# Patient Record
Sex: FEMALE | Race: WHITE | HISPANIC OR LATINO | Employment: UNEMPLOYED | URBAN - METROPOLITAN AREA
[De-identification: names, ages, dates, MRNs, and addresses within clinical notes are randomized per-mention and may not be internally consistent; named-entity substitution may affect disease eponyms.]

---

## 2017-05-27 ENCOUNTER — HOSPITAL ENCOUNTER (EMERGENCY)
Facility: MEDICAL CENTER | Age: 22
End: 2017-05-27
Attending: EMERGENCY MEDICINE
Payer: COMMERCIAL

## 2017-05-27 ENCOUNTER — APPOINTMENT (OUTPATIENT)
Dept: RADIOLOGY | Facility: MEDICAL CENTER | Age: 22
End: 2017-05-27
Attending: EMERGENCY MEDICINE
Payer: COMMERCIAL

## 2017-05-27 VITALS
DIASTOLIC BLOOD PRESSURE: 54 MMHG | OXYGEN SATURATION: 98 % | HEIGHT: 60 IN | BODY MASS INDEX: 25.32 KG/M2 | WEIGHT: 128.97 LBS | HEART RATE: 112 BPM | RESPIRATION RATE: 16 BRPM | TEMPERATURE: 99.4 F | SYSTOLIC BLOOD PRESSURE: 96 MMHG

## 2017-05-27 DIAGNOSIS — N12 PYELONEPHRITIS: ICD-10-CM

## 2017-05-27 LAB
ALBUMIN SERPL BCP-MCNC: 3.3 G/DL (ref 3.2–4.9)
ALBUMIN/GLOB SERPL: 0.9 G/DL
ALP SERPL-CCNC: 101 U/L (ref 30–99)
ALT SERPL-CCNC: 17 U/L (ref 2–50)
ANION GAP SERPL CALC-SCNC: 10 MMOL/L (ref 0–11.9)
APPEARANCE UR: ABNORMAL
APPEARANCE UR: CLEAR
AST SERPL-CCNC: 23 U/L (ref 12–45)
BACTERIA #/AREA URNS HPF: ABNORMAL /HPF
BASOPHILS # BLD AUTO: 0.8 % (ref 0–1.8)
BASOPHILS # BLD: 0.11 K/UL (ref 0–0.12)
BILIRUB SERPL-MCNC: 0.4 MG/DL (ref 0.1–1.5)
BILIRUB UR QL STRIP.AUTO: NEGATIVE
BUN SERPL-MCNC: 11 MG/DL (ref 8–22)
CALCIUM SERPL-MCNC: 9.1 MG/DL (ref 8.5–10.5)
CHLORIDE SERPL-SCNC: 93 MMOL/L (ref 96–112)
CO2 SERPL-SCNC: 24 MMOL/L (ref 20–33)
COLOR UR AUTO: YELLOW
COLOR UR: YELLOW
CREAT SERPL-MCNC: 0.74 MG/DL (ref 0.5–1.4)
EOSINOPHIL # BLD AUTO: 0.09 K/UL (ref 0–0.51)
EOSINOPHIL NFR BLD: 0.7 % (ref 0–6.9)
EPI CELLS #/AREA URNS HPF: ABNORMAL /HPF
ERYTHROCYTE [DISTWIDTH] IN BLOOD BY AUTOMATED COUNT: 39 FL (ref 35.9–50)
GFR SERPL CREATININE-BSD FRML MDRD: >60 ML/MIN/1.73 M 2
GLOBULIN SER CALC-MCNC: 3.7 G/DL (ref 1.9–3.5)
GLUCOSE SERPL-MCNC: 113 MG/DL (ref 65–99)
GLUCOSE UR QL STRIP.AUTO: NEGATIVE MG/DL
GLUCOSE UR STRIP.AUTO-MCNC: NEGATIVE MG/DL
HCG SERPL QL: NEGATIVE
HCG UR QL: NEGATIVE
HCT VFR BLD AUTO: 36.1 % (ref 37–47)
HGB BLD-MCNC: 12.6 G/DL (ref 12–16)
HYALINE CASTS #/AREA URNS LPF: ABNORMAL /LPF
IMM GRANULOCYTES # BLD AUTO: 0.09 K/UL (ref 0–0.11)
IMM GRANULOCYTES NFR BLD AUTO: 0.7 % (ref 0–0.9)
KETONES UR QL STRIP.AUTO: NEGATIVE MG/DL
KETONES UR STRIP.AUTO-MCNC: NEGATIVE MG/DL
LACTATE BLD-SCNC: 1.3 MMOL/L (ref 0.5–2)
LEUKOCYTE ESTERASE UR QL STRIP.AUTO: ABNORMAL
LEUKOCYTE ESTERASE UR QL STRIP.AUTO: ABNORMAL
LYMPHOCYTES # BLD AUTO: 1.03 K/UL (ref 1–4.8)
LYMPHOCYTES NFR BLD: 7.7 % (ref 22–41)
MCH RBC QN AUTO: 30.1 PG (ref 27–33)
MCHC RBC AUTO-ENTMCNC: 34.9 G/DL (ref 33.6–35)
MCV RBC AUTO: 86.2 FL (ref 81.4–97.8)
MICRO URNS: ABNORMAL
MONOCYTES # BLD AUTO: 2.03 K/UL (ref 0–0.85)
MONOCYTES NFR BLD AUTO: 15.3 % (ref 0–13.4)
MUCOUS THREADS #/AREA URNS HPF: ABNORMAL /HPF
NEUTROPHILS # BLD AUTO: 9.95 K/UL (ref 2–7.15)
NEUTROPHILS NFR BLD: 74.8 % (ref 44–72)
NITRITE UR QL STRIP.AUTO: NEGATIVE
NITRITE UR QL STRIP.AUTO: NEGATIVE
NRBC # BLD AUTO: 0 K/UL
NRBC BLD AUTO-RTO: 0 /100 WBC
PH UR STRIP.AUTO: 6 [PH]
PH UR STRIP.AUTO: 6.5 [PH]
PLATELET # BLD AUTO: 279 K/UL (ref 164–446)
PMV BLD AUTO: 8.7 FL (ref 9–12.9)
POTASSIUM SERPL-SCNC: 3.5 MMOL/L (ref 3.6–5.5)
PROT SERPL-MCNC: 7 G/DL (ref 6–8.2)
PROT UR QL STRIP: 100 MG/DL
PROT UR QL STRIP: 70 MG/DL
RBC # BLD AUTO: 4.19 M/UL (ref 4.2–5.4)
RBC # URNS HPF: ABNORMAL /HPF
RBC UR QL AUTO: ABNORMAL
RBC UR QL AUTO: NEGATIVE
SODIUM SERPL-SCNC: 127 MMOL/L (ref 135–145)
SP GR UR STRIP.AUTO: 1.02
SP GR UR: 1.02
WBC # BLD AUTO: 13.3 K/UL (ref 4.8–10.8)
WBC #/AREA URNS HPF: ABNORMAL /HPF

## 2017-05-27 PROCEDURE — 700111 HCHG RX REV CODE 636 W/ 250 OVERRIDE (IP): Performed by: EMERGENCY MEDICINE

## 2017-05-27 PROCEDURE — 87086 URINE CULTURE/COLONY COUNT: CPT

## 2017-05-27 PROCEDURE — 85025 COMPLETE CBC W/AUTO DIFF WBC: CPT

## 2017-05-27 PROCEDURE — 96365 THER/PROPH/DIAG IV INF INIT: CPT

## 2017-05-27 PROCEDURE — 71010 DX-CHEST-PORTABLE (1 VIEW): CPT

## 2017-05-27 PROCEDURE — 99283 EMERGENCY DEPT VISIT LOW MDM: CPT

## 2017-05-27 PROCEDURE — 83605 ASSAY OF LACTIC ACID: CPT

## 2017-05-27 PROCEDURE — 80053 COMPREHEN METABOLIC PANEL: CPT

## 2017-05-27 PROCEDURE — 84703 CHORIONIC GONADOTROPIN ASSAY: CPT

## 2017-05-27 PROCEDURE — A9270 NON-COVERED ITEM OR SERVICE: HCPCS | Performed by: EMERGENCY MEDICINE

## 2017-05-27 PROCEDURE — 74176 CT ABD & PELVIS W/O CONTRAST: CPT

## 2017-05-27 PROCEDURE — 81002 URINALYSIS NONAUTO W/O SCOPE: CPT | Mod: 59

## 2017-05-27 PROCEDURE — 87040 BLOOD CULTURE FOR BACTERIA: CPT

## 2017-05-27 PROCEDURE — 87077 CULTURE AEROBIC IDENTIFY: CPT

## 2017-05-27 PROCEDURE — 81025 URINE PREGNANCY TEST: CPT

## 2017-05-27 PROCEDURE — 700105 HCHG RX REV CODE 258: Performed by: EMERGENCY MEDICINE

## 2017-05-27 PROCEDURE — 700102 HCHG RX REV CODE 250 W/ 637 OVERRIDE(OP): Performed by: EMERGENCY MEDICINE

## 2017-05-27 PROCEDURE — 81001 URINALYSIS AUTO W/SCOPE: CPT

## 2017-05-27 PROCEDURE — 36415 COLL VENOUS BLD VENIPUNCTURE: CPT

## 2017-05-27 PROCEDURE — 87186 SC STD MICRODIL/AGAR DIL: CPT

## 2017-05-27 RX ORDER — SODIUM CHLORIDE 9 MG/ML
30 INJECTION, SOLUTION INTRAVENOUS ONCE
Status: COMPLETED | OUTPATIENT
Start: 2017-05-27 | End: 2017-05-27

## 2017-05-27 RX ORDER — CIPROFLOXACIN 500 MG/1
500 TABLET, FILM COATED ORAL 2 TIMES DAILY
Qty: 20 TAB | Refills: 0 | Status: SHIPPED | OUTPATIENT
Start: 2017-05-27 | End: 2017-06-06

## 2017-05-27 RX ORDER — ACETAMINOPHEN 325 MG/1
650 TABLET ORAL ONCE
Status: COMPLETED | OUTPATIENT
Start: 2017-05-27 | End: 2017-05-27

## 2017-05-27 RX ORDER — CEFTRIAXONE 1 G/1
1 INJECTION, POWDER, FOR SOLUTION INTRAMUSCULAR; INTRAVENOUS ONCE
Status: COMPLETED | OUTPATIENT
Start: 2017-05-27 | End: 2017-05-27

## 2017-05-27 RX ORDER — TRAMADOL HYDROCHLORIDE 50 MG/1
50-100 TABLET ORAL EVERY 6 HOURS PRN
Qty: 15 TAB | Refills: 0 | Status: SHIPPED | OUTPATIENT
Start: 2017-05-27 | End: 2017-07-07

## 2017-05-27 RX ORDER — ONDANSETRON 4 MG/1
4 TABLET, ORALLY DISINTEGRATING ORAL EVERY 8 HOURS PRN
Qty: 10 TAB | Refills: 0 | Status: SHIPPED | OUTPATIENT
Start: 2017-05-27 | End: 2017-07-07

## 2017-05-27 RX ADMIN — ACETAMINOPHEN 650 MG: 325 TABLET, FILM COATED ORAL at 17:22

## 2017-05-27 RX ADMIN — SODIUM CHLORIDE 1755 ML: 9 INJECTION, SOLUTION INTRAVENOUS at 17:15

## 2017-05-27 RX ADMIN — CEFTRIAXONE SODIUM 1 G: 1 INJECTION, POWDER, FOR SOLUTION INTRAMUSCULAR; INTRAVENOUS at 18:41

## 2017-05-27 NOTE — ED AVS SNAPSHOT
5/27/2017    Rosana Rick  [address] [facility], [place] [address]    Dear Rosana:    [facility] wants to ensure your discharge home is safe and you or your loved ones have had all of your questions answered regarding your care after you leave the hospital.    Below is a list of resources and contact information should you have any questions regarding your hospital stay, follow-up instructions, or active medical symptoms.    Questions or Concerns Regarding… Contact   Medical Questions Related to Your Discharge  (7 days a week, 8am-5pm) Contact a Nurse Care Coordinator   [phone]   Medical Questions Not Related to Your Discharge  (24 hours a day / 7 days a week)  Contact the Nurse Health Line   [phone]    Medications or Discharge Instructions Refer to your discharge packet   or contact your [facility] Primary Care Provider   [phone]   Follow-up Appointment(s) Schedule your appointment via City Notes   or contact Scheduling [phone]   Billing Review your statement via City Notes  or contact Billing [phone]   Medical Records Review your records via City Notes   or contact Medical Records [phone]     You may receive a telephone call within two days of discharge. This call is to make certain you understand your discharge instructions and have the opportunity to have any questions answered. You can also easily access your medical information, test results and upcoming appointments via the City Notes free online health management tool. You can learn more and sign up at InterResolve/City Notes. For assistance setting up your City Notes account, please call [phone].    Once again, we want to ensure your discharge home is safe and that you have a clear understanding of any next steps in your care. If you have any questions or concerns, please do not hesitate to contact us, we are here for you. Thank you for choosing [facility] for your healthcare needs.    Sincerely,    Your [facility] Healthcare Team

## 2017-05-27 NOTE — ED AVS SNAPSHOT
The Resumator Access Code: [email or web address]  Expires: 6/26/2017  7:03 PM    The Resumator  A secure, online tool to manage your health information     [employer]’s The Resumator® is a secure, online tool that connects you to your personalized health information from the privacy of your home -- day or night - making it very easy for you to manage your healthcare. Once the activation process is completed, you can even access your medical information using the The Resumator sheng, which is available for free in the Apple Sheng store or Google Play store.     The Resumator provides the following levels of access (as shown below):   My Chart Features   [facility] Primary Care Doctor [facility]  Specialists [facility]  Urgent  Care Non-[facility]  Primary Care  Doctor   Email your healthcare team securely and privately 24/7 X X X X   Manage appointments: schedule your next appointment; view details of past/upcoming appointments X      Request prescription refills. X      View recent personal medical records, including lab and immunizations X X X X   View health record, including health history, allergies, medications X X X X   Read reports about your outpatient visits, procedures, consult and ER notes X X X X   See your discharge summary, which is a recap of your hospital and/or ER visit that includes your diagnosis, lab results, and care plan. X X       How to register for The Resumator:  1. Go to  [email or web address].  2. Click on the Sign Up Now box, which takes you to the New Member Sign Up page. You will need to provide the following information:  a. Enter your The Resumator Access Code exactly as it appears at the top of this page. (You will not need to use this code after you’ve completed the sign-up process. If you do not sign up before the expiration date, you must request a new code.)   b. Enter your date of birth.   c. Enter your home email address.   d. Click Submit, and follow the next screen’s instructions.  3. Create a The Resumator ID. This will be your The Resumator  login ID and cannot be changed, so think of one that is secure and easy to remember.  4. Create a [employer] password. You can change your password at any time.  5. Enter your Password Reset Question and Answer. This can be used at a later time if you forget your password.   6. Enter your e-mail address. This allows you to receive e-mail notifications when new information is available in Learn[employer].  7. Click Sign Up. You can now view your health information.    For assistance activating your [employer] account, call [phone]

## 2017-05-27 NOTE — ED AVS SNAPSHOT
Home Care Instructions                                                                                                                Rosana Rick   MRN: [id number]    Department:  [facility], Emergency Dept   Date of Visit:  5/27/2017            [facility], Emergency Dept    [address] [facility] [address]    Phone:  [phone]      You were seen by     Butch Deleon M.D.      Your Diagnosis Was     Pyelonephritis     N12       These are the medications you received during your hospitalization from 05/27/2017 1641 to 05/27/2017 1903     Date/Time Order Dose Route Action    05/27/2017 1722 acetaminophen (TYLENOL) tablet 650 mg 650 mg Oral Given    05/27/2017 1715 NS infusion 1,755 mL 1,755 mL Intravenous New Bag    05/27/2017 1841 cefTRIAXone (ROCEPHIN) injection 1 g 1 g Intravenous Given      Follow-up Information     1. Follow up with [facility], Emergency Dept In 1 day.    Specialty:  Emergency Medicine    Why:  As needed, If symptoms worsen    Contact information    [address] [place] [id number] [phone]      Medication Information     Review all of your home medications and newly ordered medications with your primary doctor and/or pharmacist as soon as possible. Follow medication instructions as directed by your doctor and/or pharmacist.     Please keep your complete medication list with you and share with your physician. Update the information when medications are discontinued, doses are changed, or new medications (including over-the-counter products) are added; and carry medication information at all times in the event of emergency situations.               Medication List      START taking these medications        Instructions    Morning Afternoon Evening Bedtime    ciprofloxacin 500 MG Tabs   Commonly known as:  CIPRO        Take 1 Tab by mouth 2 times a day for 10 days.   Dose:  500 mg                        ondansetron  4 MG Tbdp   Commonly known as:  ZOFRAN ODT        Take 1 Tab by mouth every 8 hours as needed for Nausea/Vomiting.   Dose:  4 mg                        tramadol 50 MG Tabs   Commonly known as:  ULTRAM        Take 1-2 Tabs by mouth every 6 hours as needed for Moderate Pain.   Dose:   mg                             Where to Get Your Medications      You can get these medications from any pharmacy     Bring a paper prescription for each of these medications    - ciprofloxacin 500 MG Tabs  - ondansetron 4 MG Tbdp  - tramadol 50 MG Tabs            Procedures and tests performed during your visit     BLOOD CULTURE    CBC WITH DIFFERENTIAL    COMP METABOLIC PANEL    CT-RENAL COLIC EVALUATION(A/P W/O)    DX-CHEST-PORTABLE (1 VIEW)    ESTIMATED GFR    HCG QUAL SERUM    Lactic acid (lactate)    OXYGEN THERAPY PER PROTOCOL    POC UA    POC URINE PREGNANCY    URINALYSIS    URINE CULTURE(NEW)    URINE MICROSCOPIC (W/UA)        Discharge Instructions       Pyelonephritis, Adult  Pyelonephritis is a kidney infection. In general, there are 2 main types of pyelonephritis:  · Infections that come on quickly without any warning (acute pyelonephritis).  · Infections that persist for a long period of time (chronic pyelonephritis).  CAUSES   Two main causes of pyelonephritis are:  · Bacteria traveling from the bladder to the kidney. This is a problem especially in pregnant women. The urine in the bladder can become filled with bacteria from multiple causes, including:  ¨ Inflammation of the prostate gland (prostatitis).  ¨ Sexual intercourse in females.  ¨ Bladder infection (cystitis).  · Bacteria traveling from the bloodstream to the tissue part of the kidney.  Problems that may increase your risk of getting a kidney infection include:  · Diabetes.  · Kidney stones or bladder stones.  · Cancer.  · Catheters placed in the bladder.  · Other abnormalities of the kidney or ureter.  SYMPTOMS   · Abdominal pain.  · Pain in the side or  flank area.  · Fever.  · Chills.  · Upset stomach.  · Blood in the urine (dark urine).  · Frequent urination.  · Strong or persistent urge to urinate.  · Burning or stinging when urinating.  DIAGNOSIS   Your caregiver may diagnose your kidney infection based on your symptoms. A urine sample may also be taken.  TREATMENT   In general, treatment depends on how severe the infection is.   · If the infection is mild and caught early, your caregiver may treat you with oral antibiotics and send you home.  · If the infection is more severe, the bacteria may have gotten into the bloodstream. This will require intravenous (IV) antibiotics and a hospital stay. Symptoms may include:  ¨ High fever.  ¨ Severe flank pain.  ¨ Shaking chills.  · Even after a hospital stay, your caregiver may require you to be on oral antibiotics for a period of time.  · Other treatments may be required depending upon the cause of the infection.  HOME CARE INSTRUCTIONS   · Take your antibiotics as directed. Finish them even if you start to feel better.  · Make an appointment to have your urine checked to make sure the infection is gone.  · Drink enough fluids to keep your urine clear or pale yellow.  · Take medicines for the bladder if you have urgency and frequency of urination as directed by your caregiver.  SEEK IMMEDIATE MEDICAL CARE IF:   · You have a fever or persistent symptoms for more than 2-3 days.  · You have a fever and your symptoms suddenly get worse.  · You are unable to take your antibiotics or fluids.  · You develop shaking chills.  · You experience extreme weakness or fainting.  · There is no improvement after 2 days of treatment.  MAKE SURE YOU:  · Understand these instructions.  · Will watch your condition.  · Will get help right away if you are not doing well or get worse.     This information is not intended to replace advice given to you by your health care provider. Make sure you discuss any questions you have with your health  care provider.     Document Released: 12/18/2006 Document Revised: 06/18/2013 Document Reviewed: 05/23/2012  Elsevier Interactive Patient Education ©2016 Elsevier Inc.            Patient Information     Patient Information    Following emergency treatment: all patient requiring follow-up care must return either to a private physician or a clinic if your condition worsens before you are able to obtain further medical attention, please return to the emergency room.     Billing Information    At [facility], we work to make the billing process streamlined for our patients.  Our Representatives are here to answer any questions you may have regarding your hospital bill.  If you have insurance coverage and have supplied your insurance information to us, we will submit a claim to your insurer on your behalf.  Should you have any questions regarding your bill, we can be reached online or by phone as follows:  Online: You are able pay your bills online or live chat with our representatives about any billing questions you may have. We are here to help Monday - Friday from 8:00am to 7:30pm and 9:00am - 12:00pm on Saturdays.  Please visit [email or web address]  for more information.   Phone:  [phone] or [phone]    Please note that your emergency physician, surgeon, pathologist, radiologist, anesthesiologist, and other specialists are not employed by [facility] and will therefore bill separately for their services.  Please contact them directly for any questions concerning their bills at the numbers below:     Emergency Physician Services:  [phone]  [facility]:  [phone]  [facility]:  [phone]  [facility]:  [phone]    1. Your final bill may vary from the amount quoted upon discharge if all procedures are not complete at that time, or if your doctor has additional procedures of which we are not aware. You will receive  an additional bill if you return to the Emergency Department at [facility] for suture removal regardless of the facility of which the sutures were placed.     2. Please arrange for settlement of this account at the emergency registration.    3. All self-pay accounts are due in full at the time of treatment.  If you are unable to meet this obligation then payment is expected within 4-5 days.     4. If you have had radiology studies (CT, X-ray, Ultrasound, MRI), you have received a preliminary result during your emergency department visit. Please contact the radiology department [phone] to receive a copy of your final result. Please discuss the Final result with your primary physician or with the follow up physician provided.     Crisis Hotline:  [place] Crisis Hotline:  7-245-USCMOTN or 1-537.838.8863  [place] Crisis Hotline:    1-317.184.8059 or 114-922-1492         ED Discharge Follow Up Questions    1. In order to provide you with very good care, we would like to follow up with a phone call in the next few days.  May we have your permission to contact you?     YES /  NO    2. What is the best phone number to call you? (       )_____-__________    3. What is the best time to call you?      Morning  /  Afternoon  /  Evening                   Patient Signature:  ____________________________________________________________    Date:  ____________________________________________________________

## 2017-05-27 NOTE — ED NOTES
"Chief Complaint   Patient presents with   • Fever     \"on and off for 3 days\". Did not take with thermometer.   • Chills     Body aches and chills. Tired and weak.   • Flank Pain     c/o left sided flank pain radiating to her LUQ.      Pt with sepsis score of 3. Protocol initiated. Charge notified.   BP 96/54 mmHg  Pulse 112  Temp(Src) 37.4 °C (99.4 °F)  Resp 14  Ht 1.524 m (5')  Wt 58.5 kg (128 lb 15.5 oz)  BMI 25.19 kg/m2  SpO2 98%    "

## 2017-05-27 NOTE — LETTER
5/30/2017               Rosana Rick  [address] [address]        Dear Rosana (MR#[id number])    This letter is sent in regards to your, recent visit to the [facility] Emergency Department on 5/27/2017.  During the visit, tests were performed to assist the physician in a medical diagnosis.  A review of those tests requires that we notify you of the following:    Your urine culture was POSITIVE for a bacteria called Escherichia coli. The antibiotic prescribed for you (ciprofloxacin) should be active to treat this bacteria. IT IS IMPORTANT THAT YOU CONTINUE TAKING YOUR ANTIBIOTIC UNTIL IT IS FINISHED.      Please feel free to contact me at the number below if you have any questions or concerns. Thank you for your cooperation in the matter.    Sincerely,  ED Culture Follow-Up Staff  Esvin Jon, PharmD    [facility], Emergency Department  [address] [place] [id number]  [phone] (ED Culture Line)  [phone]

## 2017-05-28 NOTE — ED NOTES
Pt discharged home. Pt given discharge instructions and prescriptions. Pt verbalized understanding. All questions answered. Vital signs WNL upon discharge. Pt steady on feet upon discharge.

## 2017-05-28 NOTE — ED PROVIDER NOTES
"ED Provider Note    CHIEF COMPLAINT  Chief Complaint   Patient presents with   • Fever     \"on and off for 3 days\". Did not take with thermometer.   • Chills     Body aches and chills. Tired and weak.   • Flank Pain     c/o left sided flank pain radiating to her LUQ.        HPI  Rosana Rick is a 21 y.o. female who presents for evaluation of fever, nausea left-sided flank pain radiating to her left lower abdomen. Patient denies pregnancy. No significant medical or surgical history. She denies history of kidney stones. She reports dull achy pain accentuated by waves of 10 out of 10 pain with associated nausea and no diarrhea. No associated numbness tingling or weakness. No associated cough or sore throat. She does report some mild dysuria    REVIEW OF SYSTEMS  See HPI for further details. Positive for fevers nausea flank pain and dysuria All other systems are negative.     PAST MEDICAL HISTORY  History reviewed. No pertinent past medical history.  No significant medical history  FAMILY HISTORY  No history of bleeding disorder    SOCIAL HISTORY  Social History     Social History   • Marital Status: Single     Spouse Name: N/A   • Number of Children: N/A   • Years of Education: N/A     Social History Main Topics   • Smoking status: Never Smoker    • Smokeless tobacco: None   • Alcohol Use: Yes      Comment: weekends only   • Drug Use: No   • Sexual Activity: Not Asked     Other Topics Concern   • None     Social History Narrative     Denies IV drugs no excessive alcohol  SURGICAL HISTORY  History reviewed. No pertinent past surgical history.  No major surgeries  CURRENT MEDICATIONS  Home Medications     Reviewed by Cristine Tinoco R.N. (Registered Nurse) on 05/27/17 at 1650  Med List Status: Complete    Medication Last Dose Status          Patient Pal Taking any Medications                        ALLERGIES  Allergies   Allergen Reactions   • Nkda [No Known Drug Allergy]        PHYSICAL EXAM  VITAL SIGNS: BP 96/54 " mmHg  Pulse 112  Temp(Src) 37.4 °C (99.4 °F)  Resp 18  Ht 1.524 m (5')  Wt 58.5 kg (128 lb 15.5 oz)  BMI 25.19 kg/m2  SpO2 98% Room air O2: 98    Constitutional: Well developed, Well nourished, No acute distress, Non-toxic appearance.   HENT: Normocephalic, Atraumatic, Bilateral external ears normal mucous membranes, No oral exudates, Nose normal.   Eyes: PERRLA, EOMI, Conjunctiva normal, No discharge.   Neck: Normal range of motion, No tenderness, Supple, No stridor.   Lymphatic: No lymphadenopathy noted.   Cardiovascular: Mild tachycardia, Normal rhythm, No murmurs, No rubs, No gallops.   Thorax & Lungs: Normal breath sounds, No respiratory distress, No wheezing, No chest tenderness.   Abdomen: Bowel sounds normal, Soft, left flank pain, left lower quadrant discomfort.   Skin: Warm, Dry, No erythema, No rash.   Extremities: Intact distal pulses, No edema, No tenderness, No cyanosis, No clubbing.   Neurologic: Alert & oriented x 3, Normal motor function, Normal sensory function, No focal deficits noted.   Psychiatric: Affect normal, Judgment normal, Mood normal.     RADIOLOGY/PROCEDURES  CT-RENAL COLIC EVALUATION(A/P W/O)   Final Result      1.  There are several small left intrarenal calcifications but there is no obstructive urolithiasis. There is no hydronephrosis.      DX-CHEST-PORTABLE (1 VIEW)   Final Result      1.  There is no acute cardiopulmonary process.            COURSE & MEDICAL DECISION MAKING  Pertinent Labs & Imaging studies reviewed. (See chart for details)  Results for orders placed or performed during the hospital encounter of 05/27/17   Lactic acid (lactate)   Result Value Ref Range    Lactic Acid 1.3 0.5 - 2.0 mmol/L   CBC WITH DIFFERENTIAL   Result Value Ref Range    WBC 13.3 (H) 4.8 - 10.8 K/uL    RBC 4.19 (L) 4.20 - 5.40 M/uL    Hemoglobin 12.6 12.0 - 16.0 g/dL    Hematocrit 36.1 (L) 37.0 - 47.0 %    MCV 86.2 81.4 - 97.8 fL    MCH 30.1 27.0 - 33.0 pg    MCHC 34.9 33.6 - 35.0 g/dL     RDW 39.0 35.9 - 50.0 fL    Platelet Count 279 164 - 446 K/uL    MPV 8.7 (L) 9.0 - 12.9 fL    Neutrophils-Polys 74.80 (H) 44.00 - 72.00 %    Lymphocytes 7.70 (L) 22.00 - 41.00 %    Monocytes 15.30 (H) 0.00 - 13.40 %    Eosinophils 0.70 0.00 - 6.90 %    Basophils 0.80 0.00 - 1.80 %    Immature Granulocytes 0.70 0.00 - 0.90 %    Nucleated RBC 0.00 /100 WBC    Neutrophils (Absolute) 9.95 (H) 2.00 - 7.15 K/uL    Lymphs (Absolute) 1.03 1.00 - 4.80 K/uL    Monos (Absolute) 2.03 (H) 0.00 - 0.85 K/uL    Eos (Absolute) 0.09 0.00 - 0.51 K/uL    Baso (Absolute) 0.11 0.00 - 0.12 K/uL    Immature Granulocytes (abs) 0.09 0.00 - 0.11 K/uL    NRBC (Absolute) 0.00 K/uL   COMP METABOLIC PANEL   Result Value Ref Range    Sodium 127 (L) 135 - 145 mmol/L    Potassium 3.5 (L) 3.6 - 5.5 mmol/L    Chloride 93 (L) 96 - 112 mmol/L    Co2 24 20 - 33 mmol/L    Anion Gap 10.0 0.0 - 11.9    Glucose 113 (H) 65 - 99 mg/dL    Bun 11 8 - 22 mg/dL    Creatinine 0.74 0.50 - 1.40 mg/dL    Calcium 9.1 8.5 - 10.5 mg/dL    AST(SGOT) 23 12 - 45 U/L    ALT(SGPT) 17 2 - 50 U/L    Alkaline Phosphatase 101 (H) 30 - 99 U/L    Total Bilirubin 0.4 0.1 - 1.5 mg/dL    Albumin 3.3 3.2 - 4.9 g/dL    Total Protein 7.0 6.0 - 8.2 g/dL    Globulin 3.7 (H) 1.9 - 3.5 g/dL    A-G Ratio 0.9 g/dL   URINALYSIS   Result Value Ref Range    Micro Urine Req Microscopic     Color Yellow     Character Sl Cloudy (A)     Specific Gravity 1.018 <1.035    Ph 6.0 5.0-8.0    Glucose Negative Negative mg/dL    Ketones Negative Negative mg/dL    Protein 70 (A) Negative mg/dL    Bilirubin Negative Negative    Nitrite Negative Negative    Leukocyte Esterase Large (A) Negative    Occult Blood Negative Negative   HCG QUAL SERUM   Result Value Ref Range    Beta-Hcg Qualitative Serum Negative Negative   ESTIMATED GFR   Result Value Ref Range    GFR If African American >60 >60 mL/min/1.73 m 2    GFR If Non African American >60 >60 mL/min/1.73 m 2   URINE MICROSCOPIC (W/UA)   Result Value Ref Range     WBC  (A) /hpf    RBC 2-5 (A) /hpf    Bacteria Moderate (A) None /hpf    Epithelial Cells Few /hpf    Mucous Threads Few /hpf    Hyaline Cast 0-2 /lpf   POC UA   Result Value Ref Range    POC Color Yellow     POC Appearance Clear     POC Glucose Negative Negative mg/dL    POC Ketones Negative Negative mg/dL    POC Specific Gravity 1.020 1.005-1.030    POC Blood Trace-lysed (A) Negative    POC Urine PH 6.5 5.0-8.0    POC Protein 100 (A) Negative mg/dL    POC Nitrites Negative Negative    POC Leukocyte Esterase Small (A) Negative   POC URINE PREGNANCY   Result Value Ref Range    POC Urine Pregnancy Test Negative Negative      The patient here has evidence of low-grade fever leukocytosis and very clear evidence of left-sided pyelonephritis. CT scan demonstrates some intrarenal stones but no obstructing stones or evidence of infected kidney stone. Lactic acid is normal. She was given IV fluids and Tylenol and IV antibiotics. She does not appear clinically toxic. I'll discharge her home on oral antibiotics  FINAL IMPRESSION  1. Uncomplicated pyelonephritis         Electronically signed by: Butch Deleon, 5/27/2017 5:22 PM

## 2017-05-28 NOTE — DISCHARGE INSTRUCTIONS
Pyelonephritis, Adult  Pyelonephritis is a kidney infection. In general, there are 2 main types of pyelonephritis:  · Infections that come on quickly without any warning (acute pyelonephritis).  · Infections that persist for a long period of time (chronic pyelonephritis).  CAUSES   Two main causes of pyelonephritis are:  · Bacteria traveling from the bladder to the kidney. This is a problem especially in pregnant women. The urine in the bladder can become filled with bacteria from multiple causes, including:  ¨ Inflammation of the prostate gland (prostatitis).  ¨ Sexual intercourse in females.  ¨ Bladder infection (cystitis).  · Bacteria traveling from the bloodstream to the tissue part of the kidney.  Problems that may increase your risk of getting a kidney infection include:  · Diabetes.  · Kidney stones or bladder stones.  · Cancer.  · Catheters placed in the bladder.  · Other abnormalities of the kidney or ureter.  SYMPTOMS   · Abdominal pain.  · Pain in the side or flank area.  · Fever.  · Chills.  · Upset stomach.  · Blood in the urine (dark urine).  · Frequent urination.  · Strong or persistent urge to urinate.  · Burning or stinging when urinating.  DIAGNOSIS   Your caregiver may diagnose your kidney infection based on your symptoms. A urine sample may also be taken.  TREATMENT   In general, treatment depends on how severe the infection is.   · If the infection is mild and caught early, your caregiver may treat you with oral antibiotics and send you home.  · If the infection is more severe, the bacteria may have gotten into the bloodstream. This will require intravenous (IV) antibiotics and a hospital stay. Symptoms may include:  ¨ High fever.  ¨ Severe flank pain.  ¨ Shaking chills.  · Even after a hospital stay, your caregiver may require you to be on oral antibiotics for a period of time.  · Other treatments may be required depending upon the cause of the infection.  HOME CARE INSTRUCTIONS   · Take your  antibiotics as directed. Finish them even if you start to feel better.  · Make an appointment to have your urine checked to make sure the infection is gone.  · Drink enough fluids to keep your urine clear or pale yellow.  · Take medicines for the bladder if you have urgency and frequency of urination as directed by your caregiver.  SEEK IMMEDIATE MEDICAL CARE IF:   · You have a fever or persistent symptoms for more than 2-3 days.  · You have a fever and your symptoms suddenly get worse.  · You are unable to take your antibiotics or fluids.  · You develop shaking chills.  · You experience extreme weakness or fainting.  · There is no improvement after 2 days of treatment.  MAKE SURE YOU:  · Understand these instructions.  · Will watch your condition.  · Will get help right away if you are not doing well or get worse.     This information is not intended to replace advice given to you by your health care provider. Make sure you discuss any questions you have with your health care provider.     Document Released: 12/18/2006 Document Revised: 06/18/2013 Document Reviewed: 05/23/2012  [employer] Interactive Patient Education ©2016 [employer]

## 2017-05-29 LAB
BACTERIA UR CULT: ABNORMAL
BACTERIA UR CULT: ABNORMAL
SIGNIFICANT IND 70042: ABNORMAL
SITE SITE: ABNORMAL
SOURCE SOURCE: ABNORMAL

## 2017-05-30 NOTE — ED NOTES
ED Positive Culture Follow-up/Notification Note:    Date: 5/30/17     Patient seen in the ED on 5/27/2017 for fever, nausea, left-sided flank pain. Also reports mild dysuria. Leukocytosis on CBC. Afebrile. CT positive for intrarenal stones.    1. Pyelonephritis       Discharge Medication List as of 5/27/2017  7:03 PM      START taking these medications    Details   ciprofloxacin (CIPRO) 500 MG Tab Take 1 Tab by mouth 2 times a day for 10 days., Disp-20 Tab, R-0, Print Rx Paper      ondansetron (ZOFRAN ODT) 4 MG TABLET DISPERSIBLE Take 1 Tab by mouth every 8 hours as needed for Nausea/Vomiting., Disp-10 Tab, R-0, Print Rx Paper      tramadol (ULTRAM) 50 MG Tab Take 1-2 Tabs by mouth every 6 hours as needed for Moderate Pain., Disp-15 Tab, R-0, Print Rx Paper             Allergies: Nkda     Final cultures:   Results     Procedure Component Value Units Date/Time    URINE CULTURE(NEW) [[id number]]  (Abnormal)  (Susceptibility) Collected:  05/27/17 1700    Order Status:  Completed Specimen Information:  Urine Updated:  05/29/17 9740     Significant Indicator POS (POS)      Source UR      Site --      Urine Culture -- (A)      Urine Culture -- (A)      Result:        Escherichia coli  >100,000 cfu/mL      Narrative:      Indication for culture:->Emergency Room Patient    Culture & Susceptibility     ESCHERICHIA COLI     Antibiotic Sensitivity Microscan Unit Status    Ampicillin Resistant >16 mcg/mL Final    Cefepime Sensitive <=8 mcg/mL Final    Cefotaxime Sensitive <=2 mcg/mL Final    Cefotetan Sensitive <=16 mcg/mL Final    Ceftazidime Sensitive <=1 mcg/mL Final    Ceftriaxone Sensitive <=8 mcg/mL Final    Cefuroxime Sensitive <=4 mcg/mL Final    Cephalothin Intermediate 16 mcg/mL Final    Ciprofloxacin Sensitive <=1 mcg/mL Final    Gentamicin Sensitive <=4 mcg/mL Final    Levofloxacin Sensitive <=2 mcg/mL Final    Nitrofurantoin Sensitive <=32 mcg/mL Final    Pip/Tazobactam Sensitive <=16 mcg/mL Final    Piperacillin  "Resistant >64 mcg/mL Final    Tigecycline Sensitive <=2 mcg/mL Final    Tobramycin Sensitive <=4 mcg/mL Final    Trimeth/Sulfa Resistant >2/38 mcg/mL Final                       BLOOD CULTURE [[id number]] Collected:  05/27/17 1711    Order Status:  Completed Specimen Information:  Blood from Peripheral Updated:  05/28/17 0726     Significant Indicator NEG      Source BLD      Site PERIPHERAL      Blood Culture --      Result:        No Growth    Note: Blood cultures are incubated for 5 days and  are monitored continuously.Positive blood cultures  are called to the RN and reported as soon as  they are identified.      Narrative:      Per Hospital Policy: Only change Specimen Src: to \"Line\" if  specified by physician order.    BLOOD CULTURE [[id number]] Collected:  05/27/17 1818    Order Status:  Completed Specimen Information:  Blood from Peripheral Updated:  05/28/17 0726     Significant Indicator NEG      Source BLD      Site PERIPHERAL      Blood Culture --      Result:        No Growth    Note: Blood cultures are incubated for 5 days and  are monitored continuously.Positive blood cultures  are called to the RN and reported as soon as  they are identified.      Narrative:      Per Hospital Policy: Only change Specimen Src: to \"Line\" if  specified by physician order.    URINALYSIS [[id number]]  (Abnormal) Collected:  05/27/17 1700    Order Status:  Completed Specimen Information:  Urine Updated:  05/27/17 1822     Micro Urine Req Microscopic      Color Yellow      Character Sl Cloudy (A)      Specific Gravity 1.018      Ph 6.0      Glucose Negative mg/dL      Ketones Negative mg/dL      Protein 70 (A) mg/dL      Bilirubin Negative      Nitrite Negative      Leukocyte Esterase Large (A)      Occult Blood Negative     Narrative:      Indication for culture:->Emergency Room Patient          Plan:   Urine culture grew E coli. Blood cultures demonstrate NGTD.  Patient received 1 dose of ceftriaxone 1g prior to discharge on " po antibiotics.  Appropriate antibiotic therapy prescribed. No changes required based upon culture result.  Sent letter to patient to notify of positive culture result and encourage compliance with prescribed antibiotics.     Esvin Jon, PharmD

## 2017-06-01 LAB
BACTERIA BLD CULT: NORMAL
BACTERIA BLD CULT: NORMAL
SIGNIFICANT IND 70042: NORMAL
SIGNIFICANT IND 70042: NORMAL
SITE SITE: NORMAL
SITE SITE: NORMAL
SOURCE SOURCE: NORMAL
SOURCE SOURCE: NORMAL

## 2017-07-07 ENCOUNTER — HOSPITAL ENCOUNTER (EMERGENCY)
Facility: MEDICAL CENTER | Age: 22
End: 2017-07-07
Attending: EMERGENCY MEDICINE
Payer: COMMERCIAL

## 2017-07-07 VITALS
TEMPERATURE: 97.9 F | WEIGHT: 125.44 LBS | SYSTOLIC BLOOD PRESSURE: 97 MMHG | HEART RATE: 99 BPM | BODY MASS INDEX: 24.63 KG/M2 | RESPIRATION RATE: 16 BRPM | OXYGEN SATURATION: 94 % | HEIGHT: 60 IN | DIASTOLIC BLOOD PRESSURE: 64 MMHG

## 2017-07-07 DIAGNOSIS — A64 STI (SEXUALLY TRANSMITTED INFECTION): ICD-10-CM

## 2017-07-07 LAB
APPEARANCE UR: CLEAR
BACTERIA GENITAL QL WET PREP: NORMAL
C TRACH DNA SPEC QL NAA+PROBE: POSITIVE
COLOR UR AUTO: YELLOW
GLUCOSE UR QL STRIP.AUTO: NEGATIVE MG/DL
HCG UR QL: NEGATIVE
KETONES UR QL STRIP.AUTO: ABNORMAL MG/DL
LEUKOCYTE ESTERASE UR QL STRIP.AUTO: ABNORMAL
N GONORRHOEA DNA SPEC QL NAA+PROBE: POSITIVE
NITRITE UR QL STRIP.AUTO: NEGATIVE
PH UR STRIP.AUTO: 6.5 [PH]
PROT UR QL STRIP: ABNORMAL MG/DL
RBC UR QL AUTO: ABNORMAL
SIGNIFICANT IND 70042: NORMAL
SITE SITE: NORMAL
SOURCE SOURCE: NORMAL
SP GR UR: 1.02
SPECIMEN SOURCE: ABNORMAL

## 2017-07-07 PROCEDURE — 87591 N.GONORRHOEAE DNA AMP PROB: CPT

## 2017-07-07 PROCEDURE — 700102 HCHG RX REV CODE 250 W/ 637 OVERRIDE(OP): Performed by: EMERGENCY MEDICINE

## 2017-07-07 PROCEDURE — A9270 NON-COVERED ITEM OR SERVICE: HCPCS | Performed by: EMERGENCY MEDICINE

## 2017-07-07 PROCEDURE — 700111 HCHG RX REV CODE 636 W/ 250 OVERRIDE (IP): Performed by: EMERGENCY MEDICINE

## 2017-07-07 PROCEDURE — 96372 THER/PROPH/DIAG INJ SC/IM: CPT

## 2017-07-07 PROCEDURE — 87491 CHLMYD TRACH DNA AMP PROBE: CPT

## 2017-07-07 PROCEDURE — 81002 URINALYSIS NONAUTO W/O SCOPE: CPT

## 2017-07-07 PROCEDURE — 700101 HCHG RX REV CODE 250: Performed by: EMERGENCY MEDICINE

## 2017-07-07 PROCEDURE — 99284 EMERGENCY DEPT VISIT MOD MDM: CPT

## 2017-07-07 PROCEDURE — 81025 URINE PREGNANCY TEST: CPT

## 2017-07-07 RX ORDER — LIDOCAINE HYDROCHLORIDE 10 MG/ML
1 INJECTION, SOLUTION INFILTRATION; PERINEURAL ONCE
Status: COMPLETED | OUTPATIENT
Start: 2017-07-07 | End: 2017-07-07

## 2017-07-07 RX ORDER — CEFTRIAXONE SODIUM 250 MG/1
250 INJECTION, POWDER, FOR SOLUTION INTRAMUSCULAR; INTRAVENOUS ONCE
Status: COMPLETED | OUTPATIENT
Start: 2017-07-07 | End: 2017-07-07

## 2017-07-07 RX ORDER — AZITHROMYCIN 250 MG/1
1000 TABLET, FILM COATED ORAL ONCE
Status: COMPLETED | OUTPATIENT
Start: 2017-07-07 | End: 2017-07-07

## 2017-07-07 RX ORDER — CEFTRIAXONE 1 G/1
1000 INJECTION, POWDER, FOR SOLUTION INTRAMUSCULAR; INTRAVENOUS ONCE
Status: DISCONTINUED | OUTPATIENT
Start: 2017-07-07 | End: 2017-07-07

## 2017-07-07 RX ADMIN — CEFTRIAXONE SODIUM 250 MG: 250 INJECTION, POWDER, FOR SOLUTION INTRAMUSCULAR; INTRAVENOUS at 08:10

## 2017-07-07 RX ADMIN — AZITHROMYCIN 1000 MG: 250 TABLET, FILM COATED ORAL at 08:10

## 2017-07-07 RX ADMIN — LIDOCAINE HYDROCHLORIDE 1 ML: 10 INJECTION, SOLUTION INFILTRATION; PERINEURAL at 08:10

## 2017-07-07 ASSESSMENT — PAIN SCALES - GENERAL
PAINLEVEL_OUTOF10: 0
PAINLEVEL_OUTOF10: 0

## 2017-07-07 NOTE — ED AVS SNAPSHOT
[employer] Access Code: [email or web address]  Expires: 8/6/2017  8:16 AM    [employer]  A secure, online tool to manage your health information     [employer]® is a secure, online tool that connects you to your personalized health information from the privacy of your home -- day or night - making it very easy for you to manage your healthcare. Once the activation process is completed, you can even access your medical information using the [employer] sheng, which is available for free in the [employer] Sheng store or Google Play store.     [employer] provides the following levels of access (as shown below):   My Chart Features   [facility] Primary Care Doctor [facility]  Specialists [facility]  Urgent  Care Non-[facility]  Primary Care  Doctor   Email your healthcare team securely and privately 24/7 X X X X   Manage appointments: schedule your next appointment; view details of past/upcoming appointments X      Request prescription refills. X      View recent personal medical records, including lab and immunizations X X X X   View health record, including health history, allergies, medications X X X X   Read reports about your outpatient visits, procedures, consult and ER notes X X X X   See your discharge summary, which is a recap of your hospital and/or ER visit that includes your diagnosis, lab results, and care plan. X X       How to register for [employer]:  1. Go to  [email or web address].  2. Click on the Sign Up Now box, which takes you to the New Member Sign Up page. You will need to provide the following information:  a. Enter your [employer] Access Code exactly as it appears at the top of this page. (You will not need to use this code after you’ve completed the sign-up process. If you do not sign up before the expiration date, you must request a new code.)   b. Enter your date of birth.   c. Enter your home email address.   d. Click Submit, and follow the next screen’s instructions.  3. Create a [employer] ID. This will be your [employer]  login ID and cannot be changed, so think of one that is secure and easy to remember.  4. Create a [employer] password. You can change your password at any time.  5. Enter your Password Reset Question and Answer. This can be used at a later time if you forget your password.   6. Enter your e-mail address. This allows you to receive e-mail notifications when new information is available in [employer].  7. Click Sign Up. You can now view your health information.    For assistance activating your [employer] account, call [phone]

## 2017-07-07 NOTE — DISCHARGE INSTRUCTIONS
Sexually Transmitted Disease  A sexually transmitted disease (STD) is a disease or infection often passed to another person during sex. However, STDs can be passed through nonsexual ways. An STD can be passed through:  · Spit (saliva).  · Semen.  · Blood.  · Mucus from the vagina.  · Pee (urine).  HOW CAN I LESSEN MY CHANCES OF GETTING AN STD?  · Use:  ¨ Latex condoms.  ¨ Water-soluble lubricants with condoms. Do not use petroleum jelly or oils.  ¨ Dental dams. These are small pieces of latex that are used as a barrier during oral sex.  · Avoid having more than one sex partner.  · Do not have sex with someone who has other sex partners.  · Do not have sex with anyone you do not know or who is at high risk for an STD.  · Avoid risky sex that can break your skin.  · Do not have sex if you have open sores on your mouth or skin.  · Avoid drinking too much alcohol or taking illegal drugs. Alcohol and drugs can affect your good judgment.  · Avoid oral and anal sex acts.  · Get shots (vaccines) for HPV and hepatitis.  · If you are at risk of being infected with HIV, it is advised that you take a certain medicine daily to prevent HIV infection. This is called pre-exposure prophylaxis (PrEP). You may be at risk if:  ¨ You are a man who has sex with other men (MSM).  ¨ You are attracted to the opposite sex (heterosexual) and are having sex with more than one partner.  ¨ You take drugs with a needle.  ¨ You have sex with someone who has HIV.  · Talk with your doctor about if you are at high risk of being infected with HIV. If you begin to take PrEP, get tested for HIV first. Get tested every 3 months for as long as you are taking PrEP.  · Get tested for STDs every year if you are sexually active. If you are treated for an STD, get tested again 3 months after you are treated.  WHAT SHOULD I DO IF I THINK I HAVE AN STD?  · See your doctor.  · Tell your sex partner(s) that you have an STD. They should be tested and treated.  · Do  not have sex until your doctor says it is okay.  WHEN SHOULD I GET HELP?  Get help right away if:  · You have bad belly (abdominal) pain.  · You are a man and have puffiness (swelling) or pain in your testicles.  · You are a woman and have puffiness in your vagina.     This information is not intended to replace advice given to you by your health care provider. Make sure you discuss any questions you have with your health care provider.     Document Released: 01/25/2006 Document Revised: 01/08/2016 Document Reviewed: 06/13/2014  [employer] Interactive Patient Education ©2016 [employer]

## 2017-07-07 NOTE — ED NOTES
Pt discharge home. Pt given discharge instructions Pt verbalized understanding, all questions answered ,vss upon d/c. Pt steady on feet upon discharge

## 2017-07-07 NOTE — ED NOTES
Pt ambulatory to triage c/o foul smelling vaginal discharge x 1 week. Denies dysuria. C/o low abd pain with intercourse only. Instructed on clean catch urine collection. VSS. Educated on triage process, encouraged to inform staff of any changes.

## 2017-07-07 NOTE — ED PROVIDER NOTES
ED Provider Note    Scribed for Derrick Diamond M.D. by Gabbi Ramirez. 7/7/2017, 7:23 AM.    Primary care provider: Pcp Pt States None  Means of arrival: walk-in  History obtained from: Patient  History limited by: None    CHIEF COMPLAINT  Chief Complaint   Patient presents with   • Vaginal Discharge     x 1 week       HPI    Rosana Rick is a 21 y.o. female who presents to the Emergency Department with complaints of abnormal vaginal discharge with associated lower abdominal pain onset one week ago. Patient confirms associated pain with intercourse. Patient denies fever. Patient arrived to the ED with her sexual partner who also has similar complaints.       REVIEW OF SYSTEMS  Pertinent positives include abnormal vaginal discharge, lower abdominal pain, dyspareunia   Pertinent negatives include no fever  See HPI for details. E.       PAST MEDICAL HISTORY   none noted       SURGICAL HISTORY  patient denies any surgical history      SOCIAL HISTORY  Social History   Substance Use Topics   • Smoking status: Never Smoker    • Smokeless tobacco:    • Alcohol Use: Yes      Comment: weekends only      History   Drug Use No       FAMILY HISTORY  None noted   CURRENT MEDICATIONS  Home Medications     Reviewed by Alma Mcnally R.N. (Registered Nurse) on 07/07/17 at 0749  Med List Status: Complete    Medication Last Dose Status          Patient Pal Taking any Medications                        ALLERGIES  Allergies   Allergen Reactions   • Nkda [No Known Drug Allergy]        PHYSICAL EXAM  VITAL SIGNS: BP 99/68 mmHg  Pulse 81  Temp(Src) 36.6 °C (97.9 °F)  Resp 16  Ht 1.524 m (5')  Wt 56.9 kg (125 lb 7.1 oz)  BMI 24.50 kg/m2  SpO2 94%    Nursing note and vitals reviewed.  Constitutional: No distress.   HENT: Head is atraumatic. Oropharynx is moist.   Eyes: Conjunctivae are normal. Pupils are equal, round, and reactive to light.   Cardiovascular: Normal peripheral perfusion  Respiratory: No respiratory distress.    : Thick yellow discharge in vaginal vault. Adnexal normal.   Musculoskeletal: Normal range of motion.   Neurological: Alert. No focal deficits noted.    Skin: No rash.   Psych: Appropriate for clinical situation           DIAGNOSTIC STUDIES / PROCEDURES  LABS  Results for orders placed or performed during the hospital encounter of 07/07/17   WET PREP   Result Value Ref Range    Significant Indicator NEG     Source GEN     Site VAGINAL     Wet Prep For Parasites       No yeast.  No motile Trichomonas seen.  No clue cells seen.     CHLAMYDIA & GC BY PCR   Result Value Ref Range    Source Vaginal    POC UA   Result Value Ref Range    POC Color Yellow     POC Appearance Clear     POC Glucose Negative Negative mg/dL    POC Ketones Trace (A) Negative mg/dL    POC Specific Gravity 1.020 1.005-1.030    POC Blood Trace-intact (A) Negative    POC Urine PH 6.5 5.0-8.0    POC Protein Trace (A) Negative mg/dL    POC Nitrites Negative Negative    POC Leukocyte Esterase Moderate (A) Negative   POC URINE PREGNANCY   Result Value Ref Range    POC Urine Pregnancy Test Negative Negative     All labs reviewed by me.      COURSE & MEDICAL DECISION MAKING  Nursing notes, VS, PMSFHx reviewed in chart.    7:23 AM - Patient seen and examined at bedside. Patient will be treated with zithromax 1,000 mg and rocephin 250 mg. Ordered POC urine pregnancy, POC UA, POC urinalysis, POC urine pregnancy and chlamydia & GC by PCR to evaluate her symptoms.     7:30 AM Performed pelvic exam with female nurse present. See physical exam for details.     8:26 AM Patient reevaluated at bedside. The patient is resting comfortably. Discussed lab results with the patient as seen above. She agrees to discharge home. Patient encouraged to follow up with primary care for a complete STD rule out.       DISPOSITION:  Patient will be discharged home in stable condition.      FOLLOW UP:  [facility], Emergency Dept  [address]  [address]  [phone]    If symptoms worsen    [address] [address]  [phone]    for comprehensive sti testing        OUTPATIENT MEDICATIONS:  There are no discharge medications for this patient.      The patient was discharged home with an information sheet on sexually transmitted infections and told to return immediately for any signs or symptoms listed.  The patient agreed to the discharge precautions and follow-up plan which is documented in EPIC.    FINAL IMPRESSION  1. STI (sexually transmitted infection)       Gabbi LOVELL (Sabrina), am scribing for, and in the presence of, Derrick Diamond M.D..  Electronically signed by: Gabbi Zelaya), 7/7/2017  Derrick LOVELL M.D. personally performed the services described in this documentation, as scribed by Gabbi Ramirez in my presence, and it is both accurate and complete.    The note accurately reflects work and decisions made by me.  Derrick Diamond  7/7/2017  11:21 AM

## 2017-07-07 NOTE — ED AVS SNAPSHOT
Home Care Instructions                                                                                                                Rosana Rick   MRN: [id number]    Department:  [facility], Emergency Dept   Date of Visit:  7/7/2017            [facility], Emergency Dept    [address] [place] [id number]    Phone:  [phone]      You were seen by     Derrick Diamond M.D.      Your Diagnosis Was     STI (sexually transmitted infection)     A64       These are the medications you received during your hospitalization from 07/07/2017 0654 to 07/07/2017 0816     Date/Time Order Dose Route Action    07/07/2017 0810 azithromycin (ZITHROMAX) tablet 1,000 mg 1,000 mg Oral Given    07/07/2017 0810 cefTRIAXone (ROCEPHIN) injection 250 mg 250 mg Intramuscular Given    07/07/2017 0810 lidocaine (XYLOCAINE) 1 % injection 1 mL Other Given      Follow-up Information     1. Follow up with [facility], Emergency Dept.    Specialty:  Emergency Medicine    Why:  If symptoms worsen    Contact information    [address] [place] [id number] [phone]        2. Follow up with [facility].    Why:  for comprehensive sti testing    Contact information    [address] [place] [id number] [phone]      Medication Information     Review all of your home medications and newly ordered medications with your primary doctor and/or pharmacist as soon as possible. Follow medication instructions as directed by your doctor and/or pharmacist.     Please keep your complete medication list with you and share with your physician. Update the information when medications are discontinued, doses are changed, or new medications (including over-the-counter products) are added; and carry medication information at all times in the event of emergency situations.               Medication List      Notice     You have not been prescribed any medications.            Procedures and tests performed during your visit     CHLAMYDIA & GC BY PCR    POC UA    POC URINE PREGNANCY    WET PREP        Discharge Instructions       Sexually Transmitted Disease  A sexually transmitted disease (STD) is a disease or infection often passed to another person during sex. However, STDs can be passed through nonsexual ways. An STD can be passed through:  · Spit (saliva).  · Semen.  · Blood.  · Mucus from the vagina.  · Pee (urine).  HOW CAN I LESSEN MY CHANCES OF GETTING AN STD?  · Use:  ¨ Latex condoms.  ¨ Water-soluble lubricants with condoms. Do not use petroleum jelly or oils.  ¨ Dental dams. These are small pieces of latex that are used as a barrier during oral sex.  · Avoid having more than one sex partner.  · Do not have sex with someone who has other sex partners.  · Do not have sex with anyone you do not know or who is at high risk for an STD.  · Avoid risky sex that can break your skin.  · Do not have sex if you have open sores on your mouth or skin.  · Avoid drinking too much alcohol or taking illegal drugs. Alcohol and drugs can affect your good judgment.  · Avoid oral and anal sex acts.  · Get shots (vaccines) for HPV and hepatitis.  · If you are at risk of being infected with HIV, it is advised that you take a certain medicine daily to prevent HIV infection. This is called pre-exposure prophylaxis (PrEP). You may be at risk if:  ¨ You are a man who has sex with other men (MSM).  ¨ You are attracted to the opposite sex (heterosexual) and are having sex with more than one partner.  ¨ You take drugs with a needle.  ¨ You have sex with someone who has HIV.  · Talk with your doctor about if you are at high risk of being infected with HIV. If you begin to take PrEP, get tested for HIV first. Get tested every 3 months for as long as you are taking PrEP.  · Get tested for STDs every year if you are sexually active. If you are treated for an STD, get tested again 3 months after you are  treated.  WHAT SHOULD I DO IF I THINK I HAVE AN STD?  · See your doctor.  · Tell your sex partner(s) that you have an STD. They should be tested and treated.  · Do not have sex until your doctor says it is okay.  WHEN SHOULD I GET HELP?  Get help right away if:  · You have bad belly (abdominal) pain.  · You are a man and have puffiness (swelling) or pain in your testicles.  · You are a woman and have puffiness in your vagina.     This information is not intended to replace advice given to you by your health care provider. Make sure you discuss any questions you have with your health care provider.     Document Released: 01/25/2006 Document Revised: 01/08/2016 Document Reviewed: 06/13/2014  [employer] Interactive Patient Education ©2016 [employer]            Patient Information     Patient Information    Following emergency treatment: all patient requiring follow-up care must return either to a private physician or a clinic if your condition worsens before you are able to obtain further medical attention, please return to the emergency room.     Billing Information    At [facility], we work to make the billing process streamlined for our patients.  Our Representatives are here to answer any questions you may have regarding your hospital bill.  If you have insurance coverage and have supplied your insurance information to us, we will submit a claim to your insurer on your behalf.  Should you have any questions regarding your bill, we can be reached online or by phone as follows:  Online: You are able pay your bills online or live chat with our representatives about any billing questions you may have. We are here to help Monday - Friday from 8:00am to 7:30pm and 9:00am - 12:00pm on Saturdays.  Please visit [email or web address]  for more information.   Phone:  [phone] or [phone]    Please note that your emergency physician, surgeon, pathologist, radiologist,  anesthesiologist, and other specialists are not employed by [facility] and will therefore bill separately for their services.  Please contact them directly for any questions concerning their bills at the numbers below:     Emergency Physician Services:  [phone]  [facility]:  [phone]  [facility]:  [phone]  [facility]:  [phone]    1. Your final bill may vary from the amount quoted upon discharge if all procedures are not complete at that time, or if your doctor has additional procedures of which we are not aware. You will receive an additional bill if you return to the Emergency Department at [facility] for suture removal regardless of the facility of which the sutures were placed.     2. Please arrange for settlement of this account at the emergency registration.    3. All self-pay accounts are due in full at the time of treatment.  If you are unable to meet this obligation then payment is expected within 4-5 days.     4. If you have had radiology studies (CT, X-ray, Ultrasound, MRI), you have received a preliminary result during your emergency department visit. Please contact the radiology department [phone] to receive a copy of your final result. Please discuss the Final result with your primary physician or with the follow up physician provided.     Crisis Hotline:  [place] Crisis Hotline:  3-830-GWMPQVD or 1-552.312.3298  [place] Crisis Hotline:    1-525.711.7291 or 157-013-2367         ED Discharge Follow Up Questions    1. In order to provide you with very good care, we would like to follow up with a phone call in the next few days.  May we have your permission to contact you?     YES /  NO    2. What is the best phone number to call you? (       )_____-__________    3. What is the best time to call you?      Morning  /  Afternoon  /  Evening                   Patient Signature:   ____________________________________________________________    Date:  ____________________________________________________________

## 2017-07-10 NOTE — ED NOTES
ED Positive Culture Follow-up/Notification Note:    Date: 7/10/17     Patient seen in the ED on 7/7/2017 for abnormal vaginal discharge with associated lower abdominal pain x 1 week.   1. STI (sexually transmitted infection)       Given Rocephin 250 mg IM in the ER and Azithromycin 1 g po in the ER.     There are no discharge medications for this patient.      Allergies: Nkda     Final cultures:   Results     Procedure Component Value Units Date/Time    CHLAMYDIA & GC BY PCR [[id number]]  (Abnormal) Collected:  07/07/17 0730    Order Status:  Completed Specimen Information:  Genital from Genital Updated:  07/07/17 1651     Source Vaginal      C. trachomatis by PCR POSITIVE (A)      N. gonorrhoeae by PCR POSITIVE (A)     WET PREP [[id number]] Collected:  07/07/17 0730    Order Status:  Completed Specimen Information:  Genital from Vaginal Updated:  07/07/17 0743     Significant Indicator NEG      Source GEN      Site VAGINAL      Wet Prep For Parasites --      Result:        No yeast.  No motile Trichomonas seen.  No clue cells seen.            Plan:   Patient treated for Gonorrhea and Chlamydia in the ER.  No further treatment warranted at this time.   Informed patient of result. She has informed partners and is aware she can seek additional testing with the Health Dept.     Elizabeth Alvarenga

## 2017-08-18 ENCOUNTER — HOSPITAL ENCOUNTER (EMERGENCY)
Dept: HOSPITAL 8 - ED | Age: 22
Discharge: HOME | End: 2017-08-18
Payer: MEDICAID

## 2017-08-18 VITALS — WEIGHT: 126.32 LBS | HEIGHT: 64 IN | BODY MASS INDEX: 21.57 KG/M2

## 2017-08-18 VITALS — DIASTOLIC BLOOD PRESSURE: 67 MMHG | SYSTOLIC BLOOD PRESSURE: 102 MMHG

## 2017-08-18 DIAGNOSIS — T19.2XXA: Primary | ICD-10-CM

## 2017-08-18 DIAGNOSIS — A56.09: ICD-10-CM

## 2017-08-18 DIAGNOSIS — A59.01: ICD-10-CM
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
## 2017-08-18 LAB — T VAGINALIS RRNA GENITAL QL PROBE: PRESENT

## 2017-08-18 PROCEDURE — 87491 CHLMYD TRACH DNA AMP PROBE: CPT

## 2017-08-18 PROCEDURE — 96372 THER/PROPH/DIAG INJ SC/IM: CPT

## 2017-08-18 PROCEDURE — 87808 TRICHOMONAS ASSAY W/OPTIC: CPT

## 2017-08-18 PROCEDURE — 99284 EMERGENCY DEPT VISIT MOD MDM: CPT

## 2017-08-18 PROCEDURE — 87210 SMEAR WET MOUNT SALINE/INK: CPT

## 2017-08-18 PROCEDURE — 87591 N.GONORRHOEAE DNA AMP PROB: CPT

## 2017-11-14 ENCOUNTER — HOSPITAL ENCOUNTER (EMERGENCY)
Facility: MEDICAL CENTER | Age: 22
End: 2017-11-14
Attending: EMERGENCY MEDICINE
Payer: COMMERCIAL

## 2017-11-14 VITALS
SYSTOLIC BLOOD PRESSURE: 128 MMHG | RESPIRATION RATE: 16 BRPM | TEMPERATURE: 98.1 F | OXYGEN SATURATION: 98 % | DIASTOLIC BLOOD PRESSURE: 69 MMHG | BODY MASS INDEX: 25.71 KG/M2 | HEART RATE: 101 BPM | HEIGHT: 60 IN | WEIGHT: 130.95 LBS

## 2017-11-14 DIAGNOSIS — A64 SEXUALLY TRANSMISSIBLE DISEASE: ICD-10-CM

## 2017-11-14 PROCEDURE — 96372 THER/PROPH/DIAG INJ SC/IM: CPT

## 2017-11-14 PROCEDURE — 700102 HCHG RX REV CODE 250 W/ 637 OVERRIDE(OP): Performed by: EMERGENCY MEDICINE

## 2017-11-14 PROCEDURE — 99284 EMERGENCY DEPT VISIT MOD MDM: CPT

## 2017-11-14 PROCEDURE — 700111 HCHG RX REV CODE 636 W/ 250 OVERRIDE (IP): Performed by: EMERGENCY MEDICINE

## 2017-11-14 PROCEDURE — A9270 NON-COVERED ITEM OR SERVICE: HCPCS | Performed by: EMERGENCY MEDICINE

## 2017-11-14 RX ORDER — AZITHROMYCIN 250 MG/1
1000 TABLET, FILM COATED ORAL ONCE
Status: COMPLETED | OUTPATIENT
Start: 2017-11-14 | End: 2017-11-14

## 2017-11-14 RX ORDER — CEFTRIAXONE SODIUM 250 MG/1
250 INJECTION, POWDER, FOR SOLUTION INTRAMUSCULAR; INTRAVENOUS ONCE
Status: COMPLETED | OUTPATIENT
Start: 2017-11-14 | End: 2017-11-14

## 2017-11-14 RX ADMIN — AZITHROMYCIN 1000 MG: 250 TABLET, FILM COATED ORAL at 09:58

## 2017-11-14 RX ADMIN — CEFTRIAXONE SODIUM 250 MG: 250 INJECTION, POWDER, FOR SOLUTION INTRAMUSCULAR; INTRAVENOUS at 09:58

## 2017-11-14 ASSESSMENT — PAIN SCALES - GENERAL: PAINLEVEL_OUTOF10: 0

## 2017-11-14 NOTE — ED NOTES
Pt discharged with instructions.  Pt verbalizes the understanding of instructions. Pt ambulated out of ER without any difficulty.

## 2017-11-14 NOTE — ED PROVIDER NOTES
ED Provider Note    Scribed for Chase Magdaleno M.D. by Gabbi Ramirez. 11/14/2017, 9:38 AM.    Primary care provider: Pcp Pt States None  Means of arrival: walk in   History obtained from: patient   History limited by: none       CHIEF COMPLAINT  Chief Complaint   Patient presents with   • Exposure to STD       HPI  Rosana Rick is a 21 y.o. female who presents to the Emergency Department following recent exposure to an STD after having unprotected sex with a female who tested positive for gonorrhea and chlamydia. She confirms having symptoms for the past two days including malodorous vaginal discharge. Her boyfriend has the same exposure with the same individual and also presents to the ED as a patient.         REVIEW OF SYSTEMS  Pertinent positives include malodorous vaginal discharge.   Pertinent negatives include no fever, no vomiting, no abdominal pain.   As above, all other systems reviewed and are negative. C.   See HPI for further details.       PAST MEDICAL HISTORY   None noted        SURGICAL HISTORY  patient denies any surgical history      SOCIAL HISTORY  Social History   Substance Use Topics   • Smoking status: Never Smoker        • Alcohol use Yes      Comment: weekends only      History   Drug Use No       FAMILY HISTORY  None noted       CURRENT MEDICATIONS  The patient denies    ALLERGIES  Allergies   Allergen Reactions   • Nkda [No Known Drug Allergy]        PHYSICAL EXAM  VITAL SIGNS: /71   Pulse (!) 115   Temp 36.7 °C (98.1 °F)   Resp 16   Ht 1.524 m (5')   Wt 59.4 kg (130 lb 15.3 oz)   SpO2 98%   BMI 25.58 kg/m²   Vitals reviewed.  Constitutional: Alert in no apparent distress.  HENT: No signs of trauma, Bilateral external ears normal, Nose normal.   Eyes: Pupils are equal and reactive, Conjunctiva normal, Non-icteric.   Neck: Normal range of motion, No tenderness, Supple, No stridor.   Lymphatic: No lymphadenopathy noted.   Abdomen: Bowel sounds normal, Soft, No tenderness,  No peritoneal signs, No masses, No pulsatile masses.   : deferred.   Skin: Warm, Dry, No erythema, No rash.   Back: No bony tenderness, No CVA tenderness.   Extremities: Intact distal pulses, No edema, No tenderness, No cyanosis  Musculoskeletal: Good range of motion in all major joints. No tenderness to palpation or major deformities noted.   Neurologic: Alert , Normal motor function, Normal sensory function, No focal deficits noted.   Psychiatric: Affect normal, Judgment normal, Mood normal.         COURSE & MEDICAL DECISION MAKING  Nursing notes, VS, PMSFHx reviewed in chart.    9:38 AM Patient seen and examined at bedside. The patient asked to have her pelvic deferred given that her boyfriend, who had the same exposure, also presented to the ED for testing. He reports discharge from his penis. His urine was sent for testing of gonorrhea and chlamydia. His MRN is [id number].  Patient understands to follow up with her established gynecologist for pelvic exam if her symptoms persist.   Patient will be treated with Rocephin 250 mg and Zithromax 1,000 mg for her symptoms.  The patient will return if worse.      The patient is referred to a primary physician for blood pressure management, diabetic screening, and for all other preventative health concerns.      DISPOSITION:  Patient will be discharged home in stable condition.      FOLLOW UP:  [facility], Emergency Dept  [address] [address] [phone]    If symptoms worsen          see your gynecologist for follow up if symptoms do not resolve      OUTPATIENT MEDICATIONS:  There are no discharge medications for this patient.          FINAL IMPRESSION  1. Sexually transmissible disease         Gabbi LOVELL (Sabrina), am scribing for, and in the presence of, Chase Magdaleno M.D..  Electronically signed by: Gabbi Zelaya), 11/14/2017  Chase LOVELL M.D. personally performed the services described in  this documentation, as scribed by Gabbi Ramirez in my presence, and it is both accurate and complete.    The note accurately reflects work and decisions made by me.  Chase Magdaleno  11/14/2017  3:57 PM

## 2017-11-14 NOTE — DISCHARGE INSTRUCTIONS
Sexually Transmitted Disease  A sexually transmitted disease (STD) is a disease or infection that may be passed (transmitted) from person to person, usually during sexual activity. This may happen by way of saliva, semen, blood, vaginal mucus, or urine. Common STDs include:  · Gonorrhea.  · Chlamydia.  · Syphilis.  · HIV and AIDS.  · Genital herpes.  · Hepatitis B and C.  · Trichomonas.  · Human papillomavirus (HPV).  · Pubic lice.  · Scabies.  · Mites.  · Bacterial vaginosis.  WHAT ARE CAUSES OF STDs?  An STD may be caused by bacteria, a virus, or parasites. STDs are often transmitted during sexual activity if one person is infected. However, they may also be transmitted through nonsexual means. STDs may be transmitted after:   · Sexual intercourse with an infected person.  · Sharing sex toys with an infected person.  · Sharing needles with an infected person or using unclean piercing or tattoo needles.  · Having intimate contact with the genitals, mouth, or rectal areas of an infected person.  · Exposure to infected fluids during birth.  WHAT ARE THE SIGNS AND SYMPTOMS OF STDs?  Different STDs have different symptoms. Some people may not have any symptoms. If symptoms are present, they may include:  · Painful or bloody urination.  · Pain in the pelvis, abdomen, vagina, anus, throat, or eyes.  · A skin rash, itching, or irritation.  · Growths, ulcerations, blisters, or sores in the genital and anal areas.  · Abnormal vaginal discharge with or without bad odor.  · Penile discharge in men.  · Fever.  · Pain or bleeding during sexual intercourse.  · Swollen glands in the groin area.  · Yellow skin and eyes (jaundice). This is seen with hepatitis.  · Swollen testicles.  · Infertility.  · Sores and blisters in the mouth.  HOW ARE STDs DIAGNOSED?  To make a diagnosis, your health care provider may:  · Take a medical history.  · Perform a physical exam.  · Take a sample of any discharge to examine.  · Swab the throat,  cervix, opening to the penis, rectum, or vagina for testing.  · Test a sample of your first morning urine.  · Perform blood tests.  · Perform a Pap test, if this applies.  · Perform a colposcopy.  · Perform a laparoscopy.  HOW ARE STDs TREATED?  Treatment depends on the STD. Some STDs may be treated but not cured.  · Chlamydia, gonorrhea, trichomonas, and syphilis can be cured with antibiotic medicine.  · Genital herpes, hepatitis, and HIV can be treated, but not cured, with prescribed medicines. The medicines lessen symptoms.  · Genital warts from HPV can be treated with medicine or by freezing, burning (electrocautery), or surgery. Warts may come back.  · HPV cannot be cured with medicine or surgery. However, abnormal areas may be removed from the cervix, vagina, or vulva.  · If your diagnosis is confirmed, your recent sexual partners need treatment. This is true even if they are symptom-free or have a negative culture or evaluation. They should not have sex until their health care providers say it is okay.  · Your health care provider may test you for infection again 3 months after treatment.  HOW CAN I REDUCE MY RISK OF GETTING AN STD?  Take these steps to reduce your risk of getting an STD:  · Use latex condoms, dental dams, and water-soluble lubricants during sexual activity. Do not use petroleum jelly or oils.  · Avoid having multiple sex partners.  · Do not have sex with someone who has other sex partners  · Do not have sex with anyone you do not know or who is at high risk for an STD.  · Avoid risky sex practices that can break your skin.  · Do not have sex if you have open sores on your mouth or skin.  · Avoid drinking too much alcohol or taking illegal drugs. Alcohol and drugs can affect your judgment and put you in a vulnerable position.  · Avoid engaging in oral and anal sex acts.  · Get vaccinated for HPV and hepatitis. If you have not received these vaccines in the past, talk to your health care  provider about whether one or both might be right for you.  · If you are at risk of being infected with HIV, it is recommended that you take a prescription medicine daily to prevent HIV infection. This is called pre-exposure prophylaxis (PrEP). You are considered at risk if:  ¨ You are a man who has sex with other men (MSM).  ¨ You are a heterosexual man or woman and are sexually active with more than one partner.  ¨ You take drugs by injection.  ¨ You are sexually active with a partner who has HIV.  · Talk with your health care provider about whether you are at high risk of being infected with HIV. If you choose to begin PrEP, you should first be tested for HIV. You should then be tested every 3 months for as long as you are taking PrEP.  WHAT SHOULD I DO IF I THINK I HAVE AN STD?  · See your health care provider.  · Tell your sexual partner(s). They should be tested and treated for any STDs.  · Do not have sex until your health care provider says it is okay.  WHEN SHOULD I GET IMMEDIATE MEDICAL CARE?  Contact your health care provider right away if:   · You have severe abdominal pain.  · You are a man and notice swelling or pain in your testicles.  · You are a woman and notice swelling or pain in your vagina.     This information is not intended to replace advice given to you by your health care provider. Make sure you discuss any questions you have with your health care provider.     Document Released: 03/09/2004 Document Revised: 01/08/2016 Document Reviewed: 07/08/2014  [employer] Interactive Patient Education ©2016 [employer]

## 2017-11-14 NOTE — ED NOTES
Pt to triage stating she was exposure to STDs in the last week. Pt with discharge and pelvic pain.   Pt advised to return to triage nurse for any changes or concerns.

## 2018-04-20 ENCOUNTER — APPOINTMENT (OUTPATIENT)
Dept: RADIOLOGY | Facility: MEDICAL CENTER | Age: 23
DRG: 781 | End: 2018-04-20
Attending: OBSTETRICS & GYNECOLOGY
Payer: COMMERCIAL

## 2018-04-20 ENCOUNTER — HOSPITAL ENCOUNTER (INPATIENT)
Facility: MEDICAL CENTER | Age: 23
LOS: 4 days | DRG: 781 | End: 2018-04-24
Attending: OBSTETRICS & GYNECOLOGY | Admitting: OBSTETRICS & GYNECOLOGY
Payer: COMMERCIAL

## 2018-04-20 LAB
ALBUMIN SERPL BCP-MCNC: 2.9 G/DL (ref 3.2–4.9)
ALBUMIN/GLOB SERPL: 0.9 G/DL
ALP SERPL-CCNC: 82 U/L (ref 30–99)
ALT SERPL-CCNC: 8 U/L (ref 2–50)
ANION GAP SERPL CALC-SCNC: 8 MMOL/L (ref 0–11.9)
APPEARANCE UR: ABNORMAL
AST SERPL-CCNC: 13 U/L (ref 12–45)
BASOPHILS # BLD AUTO: 0.4 % (ref 0–1.8)
BASOPHILS # BLD: 0.04 K/UL (ref 0–0.12)
BILIRUB SERPL-MCNC: 0.4 MG/DL (ref 0.1–1.5)
BUN SERPL-MCNC: 6 MG/DL (ref 8–22)
CALCIUM SERPL-MCNC: 8.2 MG/DL (ref 8.5–10.5)
CHLORIDE SERPL-SCNC: 98 MMOL/L (ref 96–112)
CO2 SERPL-SCNC: 23 MMOL/L (ref 20–33)
COLOR UR AUTO: ABNORMAL
CREAT SERPL-MCNC: 0.55 MG/DL (ref 0.5–1.4)
EOSINOPHIL # BLD AUTO: 0.01 K/UL (ref 0–0.51)
EOSINOPHIL NFR BLD: 0.1 % (ref 0–6.9)
ERYTHROCYTE [DISTWIDTH] IN BLOOD BY AUTOMATED COUNT: 40.9 FL (ref 35.9–50)
GLOBULIN SER CALC-MCNC: 3.2 G/DL (ref 1.9–3.5)
GLUCOSE SERPL-MCNC: 101 MG/DL (ref 65–99)
GLUCOSE UR QL STRIP.AUTO: NEGATIVE MG/DL
HCT VFR BLD AUTO: 42.2 % (ref 37–47)
HGB BLD-MCNC: 14.5 G/DL (ref 12–16)
IMM GRANULOCYTES # BLD AUTO: 0.05 K/UL (ref 0–0.11)
IMM GRANULOCYTES NFR BLD AUTO: 0.5 % (ref 0–0.9)
KETONES UR QL STRIP.AUTO: NEGATIVE MG/DL
LEUKOCYTE ESTERASE UR QL STRIP.AUTO: ABNORMAL
LYMPHOCYTES # BLD AUTO: 0.41 K/UL (ref 1–4.8)
LYMPHOCYTES NFR BLD: 4.2 % (ref 22–41)
MCH RBC QN AUTO: 30.9 PG (ref 27–33)
MCHC RBC AUTO-ENTMCNC: 34.4 G/DL (ref 33.6–35)
MCV RBC AUTO: 89.8 FL (ref 81.4–97.8)
MONOCYTES # BLD AUTO: 0.47 K/UL (ref 0–0.85)
MONOCYTES NFR BLD AUTO: 4.9 % (ref 0–13.4)
NEUTROPHILS # BLD AUTO: 8.71 K/UL (ref 2–7.15)
NEUTROPHILS NFR BLD: 89.9 % (ref 44–72)
NITRITE UR QL STRIP.AUTO: POSITIVE
NRBC # BLD AUTO: 0 K/UL
NRBC BLD-RTO: 0 /100 WBC
PH UR STRIP.AUTO: 7 [PH]
PLATELET # BLD AUTO: 196 K/UL (ref 164–446)
PMV BLD AUTO: 8.8 FL (ref 9–12.9)
POTASSIUM SERPL-SCNC: 3.7 MMOL/L (ref 3.6–5.5)
PROT SERPL-MCNC: 6.1 G/DL (ref 6–8.2)
PROT UR QL STRIP: 30 MG/DL
RBC # BLD AUTO: 4.7 M/UL (ref 4.2–5.4)
RBC UR QL AUTO: ABNORMAL
SODIUM SERPL-SCNC: 129 MMOL/L (ref 135–145)
SP GR UR: 1.01
WBC # BLD AUTO: 9.7 K/UL (ref 4.8–10.8)

## 2018-04-20 PROCEDURE — 81002 URINALYSIS NONAUTO W/O SCOPE: CPT

## 2018-04-20 PROCEDURE — 700111 HCHG RX REV CODE 636 W/ 250 OVERRIDE (IP): Performed by: OBSTETRICS & GYNECOLOGY

## 2018-04-20 PROCEDURE — 80053 COMPREHEN METABOLIC PANEL: CPT

## 2018-04-20 PROCEDURE — 700102 HCHG RX REV CODE 250 W/ 637 OVERRIDE(OP): Performed by: OBSTETRICS & GYNECOLOGY

## 2018-04-20 PROCEDURE — 4A1HXCZ MONITORING OF PRODUCTS OF CONCEPTION, CARDIAC RATE, EXTERNAL APPROACH: ICD-10-PCS | Performed by: OBSTETRICS & GYNECOLOGY

## 2018-04-20 PROCEDURE — 85025 COMPLETE CBC W/AUTO DIFF WBC: CPT

## 2018-04-20 PROCEDURE — 87186 SC STD MICRODIL/AGAR DIL: CPT

## 2018-04-20 PROCEDURE — 87086 URINE CULTURE/COLONY COUNT: CPT

## 2018-04-20 PROCEDURE — A9270 NON-COVERED ITEM OR SERVICE: HCPCS | Performed by: OBSTETRICS & GYNECOLOGY

## 2018-04-20 PROCEDURE — 87077 CULTURE AEROBIC IDENTIFY: CPT

## 2018-04-20 PROCEDURE — 770002 HCHG ROOM/CARE - OB PRIVATE (112)

## 2018-04-20 PROCEDURE — 36415 COLL VENOUS BLD VENIPUNCTURE: CPT

## 2018-04-20 PROCEDURE — 302790 HCHG STAT ANTEPARTUM CARE, DAILY

## 2018-04-20 PROCEDURE — 700105 HCHG RX REV CODE 258

## 2018-04-20 RX ORDER — SODIUM CHLORIDE, SODIUM LACTATE, POTASSIUM CHLORIDE, CALCIUM CHLORIDE 600; 310; 30; 20 MG/100ML; MG/100ML; MG/100ML; MG/100ML
1000 INJECTION, SOLUTION INTRAVENOUS CONTINUOUS
Status: DISCONTINUED | OUTPATIENT
Start: 2018-04-20 | End: 2018-04-24 | Stop reason: HOSPADM

## 2018-04-20 RX ORDER — ACETAMINOPHEN 325 MG/1
650 TABLET ORAL EVERY 4 HOURS PRN
Status: DISCONTINUED | OUTPATIENT
Start: 2018-04-20 | End: 2018-04-24 | Stop reason: HOSPADM

## 2018-04-20 RX ORDER — SODIUM CHLORIDE, SODIUM LACTATE, POTASSIUM CHLORIDE, CALCIUM CHLORIDE 600; 310; 30; 20 MG/100ML; MG/100ML; MG/100ML; MG/100ML
INJECTION, SOLUTION INTRAVENOUS
Status: COMPLETED
Start: 2018-04-20 | End: 2018-04-20

## 2018-04-20 RX ORDER — CEFAZOLIN SODIUM 2 G/100ML
2 INJECTION, SOLUTION INTRAVENOUS EVERY 8 HOURS
Status: DISCONTINUED | OUTPATIENT
Start: 2018-04-20 | End: 2018-04-21

## 2018-04-20 RX ADMIN — ACETAMINOPHEN 650 MG: 325 TABLET, FILM COATED ORAL at 20:04

## 2018-04-20 RX ADMIN — CEFAZOLIN SODIUM 2 G: 2 INJECTION, SOLUTION INTRAVENOUS at 19:59

## 2018-04-20 RX ADMIN — SODIUM CHLORIDE, POTASSIUM CHLORIDE, SODIUM LACTATE AND CALCIUM CHLORIDE 125 ML: 600; 310; 30; 20 INJECTION, SOLUTION INTRAVENOUS at 20:02

## 2018-04-20 ASSESSMENT — PATIENT HEALTH QUESTIONNAIRE - PHQ9
SUM OF ALL RESPONSES TO PHQ9 QUESTIONS 1 AND 2: 0
1. LITTLE INTEREST OR PLEASURE IN DOING THINGS: NOT AT ALL
2. FEELING DOWN, DEPRESSED, IRRITABLE, OR HOPELESS: NOT AT ALL

## 2018-04-20 ASSESSMENT — LIFESTYLE VARIABLES
DO YOU DRINK ALCOHOL: NO
EVER_SMOKED: YES
ALCOHOL_USE: NO

## 2018-04-20 ASSESSMENT — COPD QUESTIONNAIRES
HAVE YOU SMOKED AT LEAST 100 CIGARETTES IN YOUR ENTIRE LIFE: NO/DON'T KNOW
DO YOU EVER COUGH UP ANY MUCUS OR PHLEGM?: NO/ONLY WITH OCCASIONAL COLDS OR INFECTIONS
DURING THE PAST 4 WEEKS HOW MUCH DID YOU FEEL SHORT OF BREATH: NONE/LITTLE OF THE TIME
COPD SCREENING SCORE: 0

## 2018-04-20 ASSESSMENT — PAIN SCALES - GENERAL: PAINLEVEL_OUTOF10: 8

## 2018-04-20 NOTE — PROGRESS NOTES
Pt presents to labor and delivery reporting right flank pain since last night. Pt is a  with a history of pyelonephritis in previous pregnancies. Pt states she felt a little febrile last night. Rates pain at 6/10. Pt denies bleeding, lof. Does report occasional contractions. States baby is active.  1635 Dr. iPzano notified of patient's arrival. Order to send urine for culture.. DR. Pizano will return to evaluate patient.  Pt turning from side to side, u/s readjusted several times.  1800 Report to LEOBARDO Laird RN

## 2018-04-21 LAB
ALBUMIN SERPL BCP-MCNC: 2.7 G/DL (ref 3.2–4.9)
ALBUMIN/GLOB SERPL: 0.9 G/DL
ALP SERPL-CCNC: 80 U/L (ref 30–99)
ALT SERPL-CCNC: 8 U/L (ref 2–50)
ANION GAP SERPL CALC-SCNC: 8 MMOL/L (ref 0–11.9)
AST SERPL-CCNC: 15 U/L (ref 12–45)
BASOPHILS # BLD AUTO: 0 % (ref 0–1.8)
BASOPHILS # BLD: 0 K/UL (ref 0–0.12)
BILIRUB SERPL-MCNC: 0.4 MG/DL (ref 0.1–1.5)
BUN SERPL-MCNC: 4 MG/DL (ref 8–22)
CALCIUM SERPL-MCNC: 8.1 MG/DL (ref 8.5–10.5)
CHLORIDE SERPL-SCNC: 100 MMOL/L (ref 96–112)
CO2 SERPL-SCNC: 22 MMOL/L (ref 20–33)
CREAT SERPL-MCNC: 0.53 MG/DL (ref 0.5–1.4)
EOSINOPHIL # BLD AUTO: 0 K/UL (ref 0–0.51)
EOSINOPHIL NFR BLD: 0 % (ref 0–6.9)
ERYTHROCYTE [DISTWIDTH] IN BLOOD BY AUTOMATED COUNT: 41.1 FL (ref 35.9–50)
GLOBULIN SER CALC-MCNC: 2.9 G/DL (ref 1.9–3.5)
GLUCOSE SERPL-MCNC: 110 MG/DL (ref 65–99)
HCT VFR BLD AUTO: 34.6 % (ref 37–47)
HGB BLD-MCNC: 11.7 G/DL (ref 12–16)
LACTATE BLD-SCNC: 1.1 MMOL/L (ref 0.5–2)
LYMPHOCYTES # BLD AUTO: 0.49 K/UL (ref 1–4.8)
LYMPHOCYTES NFR BLD: 3.5 % (ref 22–41)
MANUAL DIFF BLD: NORMAL
MCH RBC QN AUTO: 30.2 PG (ref 27–33)
MCHC RBC AUTO-ENTMCNC: 33.8 G/DL (ref 33.6–35)
MCV RBC AUTO: 89.2 FL (ref 81.4–97.8)
MONOCYTES # BLD AUTO: 0.62 K/UL (ref 0–0.85)
MONOCYTES NFR BLD AUTO: 4.4 % (ref 0–13.4)
MORPHOLOGY BLD-IMP: NORMAL
NEUTROPHILS # BLD AUTO: 12.89 K/UL (ref 2–7.15)
NEUTROPHILS NFR BLD: 87.8 % (ref 44–72)
NEUTS BAND NFR BLD MANUAL: 4.3 % (ref 0–10)
NRBC # BLD AUTO: 0 K/UL
NRBC BLD-RTO: 0 /100 WBC
PLATELET # BLD AUTO: 186 K/UL (ref 164–446)
PLATELET BLD QL SMEAR: NORMAL
PMV BLD AUTO: 8.9 FL (ref 9–12.9)
POTASSIUM SERPL-SCNC: 3.9 MMOL/L (ref 3.6–5.5)
PROT SERPL-MCNC: 5.6 G/DL (ref 6–8.2)
RBC # BLD AUTO: 3.88 M/UL (ref 4.2–5.4)
RBC BLD AUTO: NORMAL
SODIUM SERPL-SCNC: 130 MMOL/L (ref 135–145)
WBC # BLD AUTO: 14 K/UL (ref 4.8–10.8)

## 2018-04-21 PROCEDURE — 700105 HCHG RX REV CODE 258: Performed by: OBSTETRICS & GYNECOLOGY

## 2018-04-21 PROCEDURE — 700111 HCHG RX REV CODE 636 W/ 250 OVERRIDE (IP): Performed by: OBSTETRICS & GYNECOLOGY

## 2018-04-21 PROCEDURE — 80053 COMPREHEN METABOLIC PANEL: CPT

## 2018-04-21 PROCEDURE — 85027 COMPLETE CBC AUTOMATED: CPT

## 2018-04-21 PROCEDURE — 76775 US EXAM ABDO BACK WALL LIM: CPT

## 2018-04-21 PROCEDURE — 36415 COLL VENOUS BLD VENIPUNCTURE: CPT

## 2018-04-21 PROCEDURE — 85007 BL SMEAR W/DIFF WBC COUNT: CPT

## 2018-04-21 PROCEDURE — 83605 ASSAY OF LACTIC ACID: CPT

## 2018-04-21 PROCEDURE — 302790 HCHG STAT ANTEPARTUM CARE, DAILY

## 2018-04-21 PROCEDURE — 700102 HCHG RX REV CODE 250 W/ 637 OVERRIDE(OP): Performed by: OBSTETRICS & GYNECOLOGY

## 2018-04-21 PROCEDURE — 770002 HCHG ROOM/CARE - OB PRIVATE (112)

## 2018-04-21 PROCEDURE — A9270 NON-COVERED ITEM OR SERVICE: HCPCS | Performed by: OBSTETRICS & GYNECOLOGY

## 2018-04-21 RX ORDER — ONDANSETRON 2 MG/ML
4 INJECTION INTRAMUSCULAR; INTRAVENOUS EVERY 4 HOURS PRN
Status: DISCONTINUED | OUTPATIENT
Start: 2018-04-21 | End: 2018-04-24 | Stop reason: HOSPADM

## 2018-04-21 RX ADMIN — ACETAMINOPHEN 650 MG: 325 TABLET, FILM COATED ORAL at 00:08

## 2018-04-21 RX ADMIN — SODIUM CHLORIDE, POTASSIUM CHLORIDE, SODIUM LACTATE AND CALCIUM CHLORIDE 1000 ML: 600; 310; 30; 20 INJECTION, SOLUTION INTRAVENOUS at 16:18

## 2018-04-21 RX ADMIN — CEFTRIAXONE SODIUM 1 G: 10 INJECTION, POWDER, FOR SOLUTION INTRAVENOUS at 10:08

## 2018-04-21 RX ADMIN — ONDANSETRON HYDROCHLORIDE 4 MG: 2 INJECTION, SOLUTION INTRAMUSCULAR; INTRAVENOUS at 10:29

## 2018-04-21 RX ADMIN — ACETAMINOPHEN 650 MG: 325 TABLET, FILM COATED ORAL at 16:16

## 2018-04-21 RX ADMIN — SODIUM CHLORIDE, POTASSIUM CHLORIDE, SODIUM LACTATE AND CALCIUM CHLORIDE 1000 ML: 600; 310; 30; 20 INJECTION, SOLUTION INTRAVENOUS at 04:01

## 2018-04-21 RX ADMIN — CEFAZOLIN SODIUM 2 G: 2 INJECTION, SOLUTION INTRAVENOUS at 04:01

## 2018-04-21 RX ADMIN — ACETAMINOPHEN 650 MG: 325 TABLET, FILM COATED ORAL at 07:42

## 2018-04-21 ASSESSMENT — PAIN SCALES - GENERAL
PAINLEVEL_OUTOF10: 2
PAINLEVEL_OUTOF10: 2
PAINLEVEL_OUTOF10: 4

## 2018-04-21 ASSESSMENT — PATIENT HEALTH QUESTIONNAIRE - PHQ9
2. FEELING DOWN, DEPRESSED, IRRITABLE, OR HOPELESS: NOT AT ALL
SUM OF ALL RESPONSES TO PHQ9 QUESTIONS 1 AND 2: 0
1. LITTLE INTEREST OR PLEASURE IN DOING THINGS: NOT AT ALL

## 2018-04-21 ASSESSMENT — LIFESTYLE VARIABLES: DO YOU DRINK ALCOHOL: NO

## 2018-04-21 ASSESSMENT — COPD QUESTIONNAIRES
COPD SCREENING SCORE: 0
HAVE YOU SMOKED AT LEAST 100 CIGARETTES IN YOUR ENTIRE LIFE: NO/DON'T KNOW
DO YOU EVER COUGH UP ANY MUCUS OR PHLEGM?: NO/ONLY WITH OCCASIONAL COLDS OR INFECTIONS
DURING THE PAST 4 WEEKS HOW MUCH DID YOU FEEL SHORT OF BREATH: NONE/LITTLE OF THE TIME

## 2018-04-21 NOTE — PROGRESS NOTES
0700- Report received from JAMAR Lucas RN- poc discussed  0730- pt resting states her pain is better today, FHT's are 170's, temp 100.8  0742- tylenol given  0830- pt's fever still 100.9, Dr. Beck changed abx  0900- temp 99.2 now  1008- rocephin given  1200- pt resting afebrile at this time  1611- temp 101.8  1616- tylenol given for fever  1900- Report given to Azul RN- poc discussed

## 2018-04-21 NOTE — CARE PLAN
Problem: Infection  Goal: Will remain free from infection    Intervention: Assess signs and symptoms of infection  Watching pt's temp, abx being given      Problem: Pain Management  Goal: Pain level will decrease to patient's comfort goal    Intervention: Follow pain managment plan developed in collaboration with patient and Interdisciplinary Team  Pain being managed

## 2018-04-21 NOTE — PROGRESS NOTES
1910: Report received from LEOBARDO Laird RN at this time. Patient transferred to [facility] at this time via wheelchair. IV started, monitors placed, vital signs taken.   2150:  Working at bedside to assess patient. New orders placed at this time per MD.   0700: Report given to day RN. Plan of care discussed.

## 2018-04-21 NOTE — H&P
DATE OF SERVICE:  2018    HISTORY OF PRESENT ILLNESS:  This is a 22-year-old  3, para 2 at 28   weeks and 6 days who presented to labor and delivery, feeling febrile since   last night.  She has not taken her temperature at home.  She has occasional   chills.  She denied nausea, vomiting, dysuria, or hematuria.  She denied   contractions, loss of fluid and reported the baby was moving well.  She has a   history of pyelonephritis with her prior pregnancies.    Prenatal care has been with Dr. Griffin.  Her ARIES is 2018.  Prenatal   records are not currently available.    PAST MEDICAL HISTORY:  None.    PAST SURGICAL HISTORY:  None.    MEDICATIONS:  Prenatal vitamins.    ALLERGIES:  NKDA.    GYNECOLOGIC HISTORY:  No history of sexually transmitted diseases or HSV.    OBSTETRICAL HISTORY:  She reports 2 uncomplicated vaginal deliveries.     3 is current pregnancy.    SOCIAL HISTORY:  Patient denies tobacco, alcohol or drugs.    OBJECTIVE:  VITAL SIGNS:  Temperature 38.2, pulse 107, blood pressure /53-65.  GENERAL:  She is a well-developed, well-nourished female in no acute distress.  CARDIOVASCULAR:  Regular rhythm, mild tachycardia noted.  LUNGS:  Clear to auscultation bilaterally.  ABDOMEN:  Soft, nontender.  Right flank is tender to palpation.  EXTREMITIES:  No clubbing, cyanosis or edema.  PELVIC:  Deferred.    LABORATORY DATA:  CBC, white blood cell count 9.7, hemoglobin 14, hematocrit   42, platelets 196,000, neutrophils 89.  Urinalysis is notable for protein,   nitrites, leukocyte esterase and blood.  Tocometer shows no contractions.    Fetal heart tones showed tachycardia 170s with moderate long-term variability.    ASSESSMENT AND PLAN:  This is a 22-year-old  3, para 2 at 28 weeks and   6 days with pyelonephritis.  1.  Pyelonephritis.  The patient does not appear to be clinically septic and   is hemodynamically stable at this time.  IV hydration has been started and she    has been started on Ancef 2 g every 8 hours.  A urine culture has been sent.    Labs will be repeated in the morning.  2.  Fetal tachycardia present, though this is in the presence of maternal   fever.  We will give Tylenol and IV hydration.  3.  Retroperitoneal ultrasound was ordered.       ____________________________________     MD REBECCA SOTO / JENNIFER    DD:  04/20/2018 22:03:25  DT:  04/20/2018 22:27:32    D#:  [id number]  Job#:  [id number]

## 2018-04-21 NOTE — PROGRESS NOTES
S: Pt with fever. Continues CVAT. On Ancef  O: Tu=806. BP= 97/56 Pulse 122 resp 16  Abd= NT  Ext= No s/s DVT  NST= Tachy, mod darion, Reasurring for gest age. [place]= no ctxs  A: IUP @29 wks, Pyelo  P: Plan change abx to better coverage with Rocephin. Cont to monitor.    Time 15 mins

## 2018-04-21 NOTE — PROGRESS NOTES
1800 Report received from Tessa Herrera RN, POC discussed, assumed pt care.     /61 Pulse 107, Temporal temperature 101.0 f.  Fluids held for 15 minutes, oral temp 100.7    Pt requesting Tylenol.     1845 Dr. Pizano returned call. Admit order for suspected pyelonephritis    1910 Report to Petra REID RN, pt transferred to room 231.

## 2018-04-22 PROCEDURE — 700102 HCHG RX REV CODE 250 W/ 637 OVERRIDE(OP): Performed by: OBSTETRICS & GYNECOLOGY

## 2018-04-22 PROCEDURE — A9270 NON-COVERED ITEM OR SERVICE: HCPCS | Performed by: OBSTETRICS & GYNECOLOGY

## 2018-04-22 PROCEDURE — 302790 HCHG STAT ANTEPARTUM CARE, DAILY

## 2018-04-22 PROCEDURE — 700105 HCHG RX REV CODE 258: Performed by: OBSTETRICS & GYNECOLOGY

## 2018-04-22 PROCEDURE — 770002 HCHG ROOM/CARE - OB PRIVATE (112)

## 2018-04-22 PROCEDURE — 700111 HCHG RX REV CODE 636 W/ 250 OVERRIDE (IP): Performed by: OBSTETRICS & GYNECOLOGY

## 2018-04-22 PROCEDURE — 59025 FETAL NON-STRESS TEST: CPT | Performed by: OBSTETRICS & GYNECOLOGY

## 2018-04-22 RX ADMIN — ACETAMINOPHEN 650 MG: 325 TABLET, FILM COATED ORAL at 02:37

## 2018-04-22 RX ADMIN — SODIUM CHLORIDE, POTASSIUM CHLORIDE, SODIUM LACTATE AND CALCIUM CHLORIDE 1000 ML: 600; 310; 30; 20 INJECTION, SOLUTION INTRAVENOUS at 00:54

## 2018-04-22 RX ADMIN — CEFTRIAXONE SODIUM 1 G: 10 INJECTION, POWDER, FOR SOLUTION INTRAVENOUS at 08:45

## 2018-04-22 ASSESSMENT — PAIN SCALES - GENERAL
PAINLEVEL_OUTOF10: 0
PAINLEVEL_OUTOF10: 2
PAINLEVEL_OUTOF10: 0

## 2018-04-22 ASSESSMENT — PATIENT HEALTH QUESTIONNAIRE - PHQ9
1. LITTLE INTEREST OR PLEASURE IN DOING THINGS: NOT AT ALL
2. FEELING DOWN, DEPRESSED, IRRITABLE, OR HOPELESS: NOT AT ALL
SUM OF ALL RESPONSES TO PHQ9 QUESTIONS 1 AND 2: 0

## 2018-04-22 ASSESSMENT — LIFESTYLE VARIABLES: DO YOU DRINK ALCOHOL: NO

## 2018-04-22 NOTE — PROGRESS NOTES
0700- Report received from Azul RN- poc discussed  0800- pt resting no c/o pain, states she feels better today, no bleeding or leaking, +FM  0900- morning meds given  1200- pt sitting up eating lunch, no pain at this time  1400- pt sleeping  1900- Report given to JAMAR Cohen RN- poc discussed

## 2018-04-22 NOTE — CARE PLAN
Problem: Infection  Goal: Will remain free from infection  Outcome: PROGRESSING AS EXPECTED  Abx in place, max temp during noc shift 101.1, Tylenol per MAR, temp down to 98.5, will continue to assess VS    Problem: Pain Management  Goal: Pain level will decrease to patient's comfort goal  Outcome: PROGRESSING AS EXPECTED  Pain minimal during shift, declines intervention, sleeping throughout night

## 2018-04-22 NOTE — PROGRESS NOTES
HD3    S: Pt with fever. Continues CVAT. On Ancef  O: Oy=959. 1 at 0230 . BP= 87/51 p=98  resp 16  Abd= NT  Ext= No s/s DVT  NST= 130s mod darion, Reasurring for gest age. [place]= no ctxs  A: IUP @29 1/7 wks, Pyelo  P: Plan cont Rocephin. UC growing gram neg javier. Cont to monitor.    Time 10 mins

## 2018-04-23 PROCEDURE — 700111 HCHG RX REV CODE 636 W/ 250 OVERRIDE (IP): Performed by: OBSTETRICS & GYNECOLOGY

## 2018-04-23 PROCEDURE — 59025 FETAL NON-STRESS TEST: CPT | Performed by: OBSTETRICS & GYNECOLOGY

## 2018-04-23 PROCEDURE — 700105 HCHG RX REV CODE 258: Performed by: OBSTETRICS & GYNECOLOGY

## 2018-04-23 PROCEDURE — 700102 HCHG RX REV CODE 250 W/ 637 OVERRIDE(OP): Performed by: OBSTETRICS & GYNECOLOGY

## 2018-04-23 PROCEDURE — A9270 NON-COVERED ITEM OR SERVICE: HCPCS | Performed by: OBSTETRICS & GYNECOLOGY

## 2018-04-23 PROCEDURE — 770002 HCHG ROOM/CARE - OB PRIVATE (112)

## 2018-04-23 PROCEDURE — 700101 HCHG RX REV CODE 250: Performed by: OBSTETRICS & GYNECOLOGY

## 2018-04-23 PROCEDURE — 302790 HCHG STAT ANTEPARTUM CARE, DAILY

## 2018-04-23 RX ORDER — CALCIUM CARBONATE 500 MG/1
1000 TABLET, CHEWABLE ORAL 3 TIMES DAILY PRN
Status: DISCONTINUED | OUTPATIENT
Start: 2018-04-23 | End: 2018-04-24 | Stop reason: HOSPADM

## 2018-04-23 RX ADMIN — SODIUM CHLORIDE, POTASSIUM CHLORIDE, SODIUM LACTATE AND CALCIUM CHLORIDE 1000 ML: 600; 310; 30; 20 INJECTION, SOLUTION INTRAVENOUS at 08:34

## 2018-04-23 RX ADMIN — ANTACID TABLETS 1000 MG: 500 TABLET, CHEWABLE ORAL at 08:34

## 2018-04-23 RX ADMIN — Medication 3 ML: at 20:06

## 2018-04-23 RX ADMIN — CEFTRIAXONE SODIUM 1 G: 10 INJECTION, POWDER, FOR SOLUTION INTRAVENOUS at 08:25

## 2018-04-23 RX ADMIN — Medication 3 ML: at 08:25

## 2018-04-23 ASSESSMENT — PAIN SCALES - GENERAL
PAINLEVEL_OUTOF10: 0
PAINLEVEL_OUTOF10: 0

## 2018-04-23 ASSESSMENT — LIFESTYLE VARIABLES: DO YOU DRINK ALCOHOL: NO

## 2018-04-23 NOTE — CARE PLAN
Problem: Safety  Goal: Will remain free from injury  Outcome: PROGRESSING AS EXPECTED  Patient/Family instructed to call RN with any spills, cords in the way on the floor or any other safety hazards. Patient/Family also instructed to call RN with change to LOC, feelings of dizziness, blurry vision or any change to comfort or concern for safety.   Medication administered as ordered, verified with patient/scanned in to MAR and armband on patient.     Problem: Infection  Goal: Will remain free from infection  Outcome: PROGRESSING AS EXPECTED  Ongoing observation and assessment of signs/symptoms of potential infection. Patient/family aware of importance of handwashing and available foam on the wall for use as well.    Problem: Venous Thromboembolism (VTW)/Deep Vein Thrombosis (DVT) Prevention:  Goal: Patient will participate in Venous Thrombosis (VTE)/Deep Vein Thrombosis (DVT)Prevention Measures  Outcome: PROGRESSING AS EXPECTED  Ongoing discussion regarding importance of moving extremities frequently. Ambulating to the BR, walking to the sink and while in bed stretching legs/arms, moving feet and pointing toes toward the wall and the ceiling frequently.

## 2018-04-23 NOTE — PROGRESS NOTES
2130- Report received from JAMAR Cohen RN. POC discussed.     0440- Pt awake for AM VS. Denies, pain, UCs, VB, or LOF, Reports +FM.    0700- Report given to CLAIRE Marie RN. POC discussed.

## 2018-04-23 NOTE — PROGRESS NOTES
"0700 - Report received from Mojgan MENEZES RN, care assumed. Ferrari Gestation today 28.3 weeks    0825 - Patient in bed awake to RN at  for assessment. FOB \"Caleb\" sleeping on the couch at . Reports  sleep last night, denies contractions/discomfort, denies ill feeling, reports frequent FM, no ROM no Bleeding, denies change to vision/edema/HA; states she has been getting up to the BR herself without assist, denies dizziness or weakness.    FM/[place] use discussed and placed, discussed POC, sleepy affect/mood, denies questions/concerns regarding care since arrival to [facility]. Patient encouraged to call RN with all questions/concerns/needs. The dry erase board updated with RN/CNA contact information, reviewed.      1415 - Patient requests to discharge home today. Reviewed physicians POC to initiate PO antibiotics tomorrow with discharge home after 48 hours afebrile. Patient requests that the RN call Dr. Griffin to make sure there isn't a way for her to go home earlier because she needs to go back to work tomorrow.     1430 - Call to Dr. Griffin with patient request. Review of afebrile status of 24 hours, 1 dose of ancef and 3 doses of rocephin. Physician would like to administer one more dose of rocephin tomorrow offering to administer it a couple hours early and discharging patient soon after, pending afebrile status today/tonight. Discussed with patient. Patient reports she needs to go to work at noon tomorrow and is happy with this plan.     1900 - Report to Hayden ALVAREZ RN  "

## 2018-04-23 NOTE — PROGRESS NOTES
1900-Report receivd from Seb Mccauley. POC discussed, pt verbalized understanding.  2130-Report given to SEB Ulrich.

## 2018-04-23 NOTE — PROGRESS NOTES
DATE OF SERVICE:  04/23/2018    The patient presented with pyelonephritis.  She is currently 29-2/7 weeks.    SUBJECTIVE:  The patient is doing well.  She has not had a fever in about the   last 30 hours.  Her CVA tenderness is becoming better.    OBJECTIVE:  VITAL SIGNS:  Normal.  GENERAL:  Patient is awake, alert, pleasant.  ABDOMEN:  Soft, nontender.  EXTREMITIES:  Normal.  Nonstress tests have been reassuring.    ASSESSMENT:  1.  A 29-2/7 weeks intrauterine pregnancy.  2.  Pyelonephritis.    PLAN:  We will continue Rocephin and she is afebrile for 48 hours, will start   her on some oral antibiotics.       ____________________________________     MD GRETA CASTELLANOS / NTS    DD:  04/23/2018 08:32:07  DT:  04/23/2018 10:27:45    D#:  [id number]  Job#:  [id number]

## 2018-04-24 VITALS
HEART RATE: 102 BPM | WEIGHT: 140 LBS | RESPIRATION RATE: 18 BRPM | BODY MASS INDEX: 27.48 KG/M2 | DIASTOLIC BLOOD PRESSURE: 56 MMHG | SYSTOLIC BLOOD PRESSURE: 100 MMHG | TEMPERATURE: 97.6 F | HEIGHT: 60 IN

## 2018-04-24 PROCEDURE — 700111 HCHG RX REV CODE 636 W/ 250 OVERRIDE (IP): Performed by: OBSTETRICS & GYNECOLOGY

## 2018-04-24 PROCEDURE — 59025 FETAL NON-STRESS TEST: CPT | Performed by: OBSTETRICS & GYNECOLOGY

## 2018-04-24 RX ORDER — NITROFURANTOIN MONOHYDRATE/MACROCRYSTALLINE 25; 75 MG/1; MG/1
100 CAPSULE ORAL 2 TIMES DAILY
Qty: 100 CAP | Refills: 0 | Status: ON HOLD | OUTPATIENT
Start: 2018-04-24 | End: 2018-07-14

## 2018-04-24 RX ADMIN — CEFTRIAXONE SODIUM 1 G: 10 INJECTION, POWDER, FOR SOLUTION INTRAVENOUS at 06:36

## 2018-04-24 ASSESSMENT — PAIN SCALES - GENERAL: PAINLEVEL_OUTOF10: 2

## 2018-04-24 NOTE — PROGRESS NOTES
1900-Report received from Seb Dunn. POC discussed, pt verbalized understanding.   0700-Report given to SEB Dobbins.

## 2018-04-24 NOTE — PROGRESS NOTES
"0700) report received from RAFAEL Cohen RN.  Pt sleeping  0720) Dr Griffin at bedside, pt assessed, VSS.  Discharge order received.  POC discussed with pt.  0815) Pt requesting to go home prior to monitoring, pt states \"baby moving good\".  IV discontinued. Pt given discharge insturctions and prescription instructions.  Pt verbalizes understanding and will keep next appointment  "

## 2018-04-25 NOTE — DISCHARGE SUMMARY
DISCHARGE DIAGNOSES:  1.  Intrauterine pregnancy at 28-6/7 weeks.  2.  Pyelonephritis.    HOSPITAL COURSE:  Patient is a 22-year-old female who presented on April 20th   with pyelonephritis.  She was treated with eventually Rocephin for two days   since this had her E. coli sensitive to Macrobid.  We started her on Macrobid   today.  She felt fine.  Her flank pain had resolved.  So, the patient was   discharged home on Macrobid.    SUBJECTIVE:  The patient is awake, pleasant, reports good fetal movement.    OBJECTIVE:  VITAL SIGNS:  Normal.  GENERAL:  The patient is awake, alert, and pleasant.  ABDOMEN:  Gravid.  There is no CVA tenderness.  Fetal heart tones have been   reassuring.    ASSESSMENT:  1.  A 28-6/7 weeks intrauterine pregnancy.  2.  Pyelonephritis.    PLAN:  The patient will be discharged home on Macrobid.  She is to follow up   in my office in 1 week.  She is to take Macrobid twice a day for 10 days and   take 1 pill a day for the remainder of the pregnancy.  Rationale explained.       ____________________________________     MD GRETA CASTELLANOS / NTS    DD:  04/24/2018 17:46:14  DT:  04/24/2018 18:09:10    D#:  [id number]  Job#:  [id number]

## 2018-05-25 ENCOUNTER — HOSPITAL ENCOUNTER (OUTPATIENT)
Facility: MEDICAL CENTER | Age: 23
End: 2018-05-25
Attending: OBSTETRICS & GYNECOLOGY | Admitting: OBSTETRICS & GYNECOLOGY
Payer: COMMERCIAL

## 2018-05-25 VITALS
HEIGHT: 60 IN | SYSTOLIC BLOOD PRESSURE: 109 MMHG | RESPIRATION RATE: 16 BRPM | BODY MASS INDEX: 27.48 KG/M2 | HEART RATE: 111 BPM | DIASTOLIC BLOOD PRESSURE: 68 MMHG | WEIGHT: 140 LBS

## 2018-05-25 LAB
BACTERIA GENITAL QL WET PREP: NORMAL
SIGNIFICANT IND 70042: NORMAL
SITE SITE: NORMAL
SOURCE SOURCE: NORMAL

## 2018-05-25 PROCEDURE — 59025 FETAL NON-STRESS TEST: CPT | Performed by: OBSTETRICS & GYNECOLOGY

## 2018-05-25 ASSESSMENT — PAIN SCALES - GENERAL: PAINLEVEL_OUTOF10: 0

## 2018-05-25 ASSESSMENT — LIFESTYLE VARIABLES: EVER_SMOKED: NEVER

## 2018-05-25 NOTE — PROGRESS NOTES
0230- Wet prep done and sent to lab. POC discussed with pt.   0235- Discharge instructions given and explained. Pt has category one fetal heart tracing. Denies UC's, no vaginal bleeding or leaking of fluid visualized. Abd soft. Pt verbalized understanding of discharge instructions.     0238- Discharged home undelivered. Aunt at side.

## 2018-05-25 NOTE — PROGRESS NOTES
0152- G- 4 P-2 TAB-1, EDC 22274, GA- 33  2/7 weeks. Pt presented to labor and delivery with concerns regarding vaginal discharge, states she had sexual relations with FOB and he informed her he was diagnosed with trichomoniasis. Denies vaginal bleeding or leaking of fluids. Denies UC's at this time. Confirms fetal movements. EFM and TOCO applied. POC discussed with patient. Pt verbalized understanding.    0206- Dr. Griffin notified of pt status via phone.

## 2018-05-29 NOTE — PROGRESS NOTES
DATE OF SERVICE:  05/25/2018    HISTORY OF PRESENT ILLNESS:  Patient presented at 33 weeks and 2 days as her   boyfriend has been diagnosed with Trichomonas.  Wet prep was sent and it was   negative.    Nonstress test was reactive and the patient was discharged home.       ____________________________________     MD GRETA CASTELLANOS / NTS    DD:  05/28/2018 23:46:09  DT:  05/29/2018 02:47:51    D#:  [id number]  Job#:  [id number]

## 2018-06-24 ENCOUNTER — HOSPITAL ENCOUNTER (OUTPATIENT)
Facility: MEDICAL CENTER | Age: 23
End: 2018-06-24
Attending: OBSTETRICS & GYNECOLOGY | Admitting: OBSTETRICS & GYNECOLOGY
Payer: COMMERCIAL

## 2018-06-24 VITALS
WEIGHT: 150 LBS | SYSTOLIC BLOOD PRESSURE: 116 MMHG | HEART RATE: 106 BPM | TEMPERATURE: 98.4 F | BODY MASS INDEX: 29.45 KG/M2 | HEIGHT: 60 IN | RESPIRATION RATE: 18 BRPM | DIASTOLIC BLOOD PRESSURE: 76 MMHG

## 2018-06-24 LAB — CRYSTALS AMN MICRO: NORMAL

## 2018-06-24 PROCEDURE — 89060 EXAM SYNOVIAL FLUID CRYSTALS: CPT

## 2018-06-24 PROCEDURE — 59025 FETAL NON-STRESS TEST: CPT | Performed by: OBSTETRICS & GYNECOLOGY

## 2018-06-24 NOTE — PROGRESS NOTES
Pt presented to L&D for c/o LOF at 0440, unsure color of fluid. Pt states feeling gush of fluid, then voided on self. Pt denies UC's, vaginal bleeding, states + fetal movement. EFM and TOCO applied. Sterile spec exam performed. No pooling noted. Fern slide collected and sent to lab. SVE- closed. Pt is a  with an EDC of 18 making her 37 /. Dr. Griffin notified. Orders to discharge pt home if fern negative and reactive tracing obtained. Will monitor and await fern results.

## 2018-06-24 NOTE — PROGRESS NOTES
Fern results negative. Reactive tracing obtained. Labor precautions and fetal kick count instructions given to pt. Pt  Verbalized an understanding. Pt discharged home with family at 0650.

## 2018-07-11 ENCOUNTER — HOSPITAL ENCOUNTER (OUTPATIENT)
Facility: MEDICAL CENTER | Age: 23
End: 2018-07-11
Attending: OBSTETRICS & GYNECOLOGY | Admitting: OBSTETRICS & GYNECOLOGY
Payer: COMMERCIAL

## 2018-07-11 VITALS
TEMPERATURE: 97.6 F | DIASTOLIC BLOOD PRESSURE: 62 MMHG | BODY MASS INDEX: 32.79 KG/M2 | HEIGHT: 60 IN | RESPIRATION RATE: 20 BRPM | WEIGHT: 167 LBS | SYSTOLIC BLOOD PRESSURE: 106 MMHG | HEART RATE: 111 BPM

## 2018-07-11 PROCEDURE — 59025 FETAL NON-STRESS TEST: CPT | Performed by: OBSTETRICS & GYNECOLOGY

## 2018-07-11 NOTE — PROGRESS NOTES
1440 SVE done. No changes.   1450 Dr. Griffin updated via t/c. Orders rec'd to discharge pt home. Pt is scheduled 0600 for IOL. Discharge instructions reviewed with pt and family member. Verbalize understanding.   1510 pt dressed and discharged home.

## 2018-07-11 NOTE — PROGRESS NOTES
1304 pt presents to L/D EDC 7/11/18 40weeks gest. C/O Uc's for the last hour. Pt was seen in OB office this morning and found to be 5cm. Induction scheduled for tomorrow. Pt denies LOF, bleeding. Reports positive fetal movement.

## 2018-07-12 ENCOUNTER — HOSPITAL ENCOUNTER (INPATIENT)
Facility: MEDICAL CENTER | Age: 23
LOS: 2 days | End: 2018-07-14
Attending: OBSTETRICS & GYNECOLOGY | Admitting: OBSTETRICS & GYNECOLOGY
Payer: COMMERCIAL

## 2018-07-12 DIAGNOSIS — R52 PAIN RELATED TO VAGINAL DELIVERY: ICD-10-CM

## 2018-07-12 LAB
AMPHET UR QL SCN: NEGATIVE
BARBITURATES UR QL SCN: NEGATIVE
BASOPHILS # BLD AUTO: 0.2 % (ref 0–1.8)
BASOPHILS # BLD: 0.02 K/UL (ref 0–0.12)
BENZODIAZ UR QL SCN: NEGATIVE
BZE UR QL SCN: NEGATIVE
CANNABINOIDS UR QL SCN: NEGATIVE
EOSINOPHIL # BLD AUTO: 0.23 K/UL (ref 0–0.51)
EOSINOPHIL NFR BLD: 2.7 % (ref 0–6.9)
ERYTHROCYTE [DISTWIDTH] IN BLOOD BY AUTOMATED COUNT: 40.6 FL (ref 35.9–50)
ERYTHROCYTE [DISTWIDTH] IN BLOOD BY AUTOMATED COUNT: 41.7 FL (ref 35.9–50)
HCT VFR BLD AUTO: 33.1 % (ref 37–47)
HCT VFR BLD AUTO: 36.2 % (ref 37–47)
HGB BLD-MCNC: 10.8 G/DL (ref 12–16)
HGB BLD-MCNC: 11.9 G/DL (ref 12–16)
HOLDING TUBE BB 8507: NORMAL
IMM GRANULOCYTES # BLD AUTO: 0.07 K/UL (ref 0–0.11)
IMM GRANULOCYTES NFR BLD AUTO: 0.8 % (ref 0–0.9)
LYMPHOCYTES # BLD AUTO: 2.35 K/UL (ref 1–4.8)
LYMPHOCYTES NFR BLD: 27.7 % (ref 22–41)
MCH RBC QN AUTO: 27.2 PG (ref 27–33)
MCH RBC QN AUTO: 27.7 PG (ref 27–33)
MCHC RBC AUTO-ENTMCNC: 32.6 G/DL (ref 33.6–35)
MCHC RBC AUTO-ENTMCNC: 32.9 G/DL (ref 33.6–35)
MCV RBC AUTO: 83.4 FL (ref 81.4–97.8)
MCV RBC AUTO: 84.4 FL (ref 81.4–97.8)
METHADONE UR QL SCN: NEGATIVE
MONOCYTES # BLD AUTO: 0.65 K/UL (ref 0–0.85)
MONOCYTES NFR BLD AUTO: 7.7 % (ref 0–13.4)
NEUTROPHILS # BLD AUTO: 5.16 K/UL (ref 2–7.15)
NEUTROPHILS NFR BLD: 60.9 % (ref 44–72)
NRBC # BLD AUTO: 0 K/UL
NRBC BLD-RTO: 0 /100 WBC
OPIATES UR QL SCN: NEGATIVE
OXYCODONE UR QL SCN: NEGATIVE
PCP UR QL SCN: NEGATIVE
PLATELET # BLD AUTO: 212 K/UL (ref 164–446)
PLATELET # BLD AUTO: 250 K/UL (ref 164–446)
PMV BLD AUTO: 9.7 FL (ref 9–12.9)
PMV BLD AUTO: 9.8 FL (ref 9–12.9)
PROPOXYPH UR QL SCN: NEGATIVE
RBC # BLD AUTO: 3.97 M/UL (ref 4.2–5.4)
RBC # BLD AUTO: 4.29 M/UL (ref 4.2–5.4)
WBC # BLD AUTO: 12.8 K/UL (ref 4.8–10.8)
WBC # BLD AUTO: 8.5 K/UL (ref 4.8–10.8)

## 2018-07-12 PROCEDURE — 80307 DRUG TEST PRSMV CHEM ANLYZR: CPT

## 2018-07-12 PROCEDURE — 700111 HCHG RX REV CODE 636 W/ 250 OVERRIDE (IP)

## 2018-07-12 PROCEDURE — 85027 COMPLETE CBC AUTOMATED: CPT

## 2018-07-12 PROCEDURE — A9270 NON-COVERED ITEM OR SERVICE: HCPCS | Performed by: OBSTETRICS & GYNECOLOGY

## 2018-07-12 PROCEDURE — 36415 COLL VENOUS BLD VENIPUNCTURE: CPT

## 2018-07-12 PROCEDURE — 700102 HCHG RX REV CODE 250 W/ 637 OVERRIDE(OP): Performed by: OBSTETRICS & GYNECOLOGY

## 2018-07-12 PROCEDURE — 700105 HCHG RX REV CODE 258: Performed by: OBSTETRICS & GYNECOLOGY

## 2018-07-12 PROCEDURE — 700105 HCHG RX REV CODE 258

## 2018-07-12 PROCEDURE — 700101 HCHG RX REV CODE 250

## 2018-07-12 PROCEDURE — 85025 COMPLETE CBC W/AUTO DIFF WBC: CPT

## 2018-07-12 PROCEDURE — 59409 OBSTETRICAL CARE: CPT

## 2018-07-12 PROCEDURE — 10907ZC DRAINAGE OF AMNIOTIC FLUID, THERAPEUTIC FROM PRODUCTS OF CONCEPTION, VIA NATURAL OR ARTIFICIAL OPENING: ICD-10-PCS | Performed by: OBSTETRICS & GYNECOLOGY

## 2018-07-12 PROCEDURE — 303615 HCHG EPIDURAL/SPINAL ANESTHESIA FOR LABOR

## 2018-07-12 PROCEDURE — 770002 HCHG ROOM/CARE - OB PRIVATE (112)

## 2018-07-12 PROCEDURE — 3E033VJ INTRODUCTION OF OTHER HORMONE INTO PERIPHERAL VEIN, PERCUTANEOUS APPROACH: ICD-10-PCS | Performed by: OBSTETRICS & GYNECOLOGY

## 2018-07-12 PROCEDURE — 304965 HCHG RECOVERY SERVICES

## 2018-07-12 RX ORDER — MISOPROSTOL 200 UG/1
600 TABLET ORAL PRN
Status: DISCONTINUED | OUTPATIENT
Start: 2018-07-12 | End: 2018-07-14 | Stop reason: HOSPADM

## 2018-07-12 RX ORDER — OXYCODONE HYDROCHLORIDE AND ACETAMINOPHEN 5; 325 MG/1; MG/1
2 TABLET ORAL EVERY 4 HOURS PRN
Status: DISCONTINUED | OUTPATIENT
Start: 2018-07-12 | End: 2018-07-14 | Stop reason: HOSPADM

## 2018-07-12 RX ORDER — METHYLERGONOVINE MALEATE 0.2 MG/ML
0.2 INJECTION INTRAVENOUS PRN
Status: DISCONTINUED | OUTPATIENT
Start: 2018-07-12 | End: 2018-07-14 | Stop reason: HOSPADM

## 2018-07-12 RX ORDER — SODIUM CHLORIDE, SODIUM LACTATE, POTASSIUM CHLORIDE, CALCIUM CHLORIDE 600; 310; 30; 20 MG/100ML; MG/100ML; MG/100ML; MG/100ML
INJECTION, SOLUTION INTRAVENOUS CONTINUOUS
Status: DISCONTINUED | OUTPATIENT
Start: 2018-07-12 | End: 2018-07-12

## 2018-07-12 RX ORDER — OXYCODONE HYDROCHLORIDE AND ACETAMINOPHEN 5; 325 MG/1; MG/1
1 TABLET ORAL EVERY 4 HOURS PRN
Status: CANCELLED | OUTPATIENT
Start: 2018-07-12

## 2018-07-12 RX ORDER — ALUMINA, MAGNESIA, AND SIMETHICONE 2400; 2400; 240 MG/30ML; MG/30ML; MG/30ML
30 SUSPENSION ORAL EVERY 6 HOURS PRN
Status: DISCONTINUED | OUTPATIENT
Start: 2018-07-12 | End: 2018-07-12 | Stop reason: HOSPADM

## 2018-07-12 RX ORDER — SODIUM CHLORIDE, SODIUM LACTATE, POTASSIUM CHLORIDE, CALCIUM CHLORIDE 600; 310; 30; 20 MG/100ML; MG/100ML; MG/100ML; MG/100ML
INJECTION, SOLUTION INTRAVENOUS
Status: COMPLETED
Start: 2018-07-12 | End: 2018-07-12

## 2018-07-12 RX ORDER — BUPIVACAINE HYDROCHLORIDE 2.5 MG/ML
INJECTION, SOLUTION EPIDURAL; INFILTRATION; INTRACAUDAL
Status: DISCONTINUED
Start: 2018-07-12 | End: 2018-07-12

## 2018-07-12 RX ORDER — IBUPROFEN 600 MG/1
600 TABLET ORAL EVERY 6 HOURS PRN
Status: CANCELLED | OUTPATIENT
Start: 2018-07-12

## 2018-07-12 RX ORDER — OXYCODONE HYDROCHLORIDE AND ACETAMINOPHEN 5; 325 MG/1; MG/1
2 TABLET ORAL EVERY 4 HOURS PRN
Status: CANCELLED | OUTPATIENT
Start: 2018-07-12

## 2018-07-12 RX ORDER — MISOPROSTOL 200 UG/1
800 TABLET ORAL
Status: DISCONTINUED | OUTPATIENT
Start: 2018-07-12 | End: 2018-07-12 | Stop reason: HOSPADM

## 2018-07-12 RX ORDER — ONDANSETRON 2 MG/ML
4 INJECTION INTRAMUSCULAR; INTRAVENOUS EVERY 6 HOURS PRN
Status: DISCONTINUED | OUTPATIENT
Start: 2018-07-12 | End: 2018-07-14 | Stop reason: HOSPADM

## 2018-07-12 RX ORDER — VITAMIN A ACETATE, BETA CAROTENE, ASCORBIC ACID, CHOLECALCIFEROL, .ALPHA.-TOCOPHEROL ACETATE, DL-, THIAMINE MONONITRATE, RIBOFLAVIN, NIACINAMIDE, PYRIDOXINE HYDROCHLORIDE, FOLIC ACID, CYANOCOBALAMIN, CALCIUM CARBONATE, FERROUS FUMARATE, ZINC OXIDE, CUPRIC OXIDE 3080; 12; 120; 400; 1; 1.84; 3; 20; 22; 920; 25; 200; 27; 10; 2 [IU]/1; UG/1; MG/1; [IU]/1; MG/1; MG/1; MG/1; MG/1; MG/1; [IU]/1; MG/1; MG/1; MG/1; MG/1; MG/1
1 TABLET, FILM COATED ORAL EVERY MORNING
Status: DISCONTINUED | OUTPATIENT
Start: 2018-07-13 | End: 2018-07-14 | Stop reason: HOSPADM

## 2018-07-12 RX ORDER — OXYCODONE HYDROCHLORIDE AND ACETAMINOPHEN 5; 325 MG/1; MG/1
1 TABLET ORAL EVERY 4 HOURS PRN
Status: DISCONTINUED | OUTPATIENT
Start: 2018-07-12 | End: 2018-07-14 | Stop reason: HOSPADM

## 2018-07-12 RX ORDER — ONDANSETRON 4 MG/1
4 TABLET, ORALLY DISINTEGRATING ORAL EVERY 6 HOURS PRN
Status: DISCONTINUED | OUTPATIENT
Start: 2018-07-12 | End: 2018-07-14 | Stop reason: HOSPADM

## 2018-07-12 RX ORDER — CARBOPROST TROMETHAMINE 250 UG/ML
250 INJECTION, SOLUTION INTRAMUSCULAR PRN
Status: DISCONTINUED | OUTPATIENT
Start: 2018-07-12 | End: 2018-07-14 | Stop reason: HOSPADM

## 2018-07-12 RX ORDER — IBUPROFEN 600 MG/1
600 TABLET ORAL EVERY 6 HOURS PRN
Status: DISCONTINUED | OUTPATIENT
Start: 2018-07-12 | End: 2018-07-14 | Stop reason: HOSPADM

## 2018-07-12 RX ORDER — ACETAMINOPHEN 325 MG/1
325 TABLET ORAL EVERY 4 HOURS PRN
Status: CANCELLED | OUTPATIENT
Start: 2018-07-12

## 2018-07-12 RX ORDER — ROPIVACAINE HYDROCHLORIDE 2 MG/ML
INJECTION, SOLUTION EPIDURAL; INFILTRATION; PERINEURAL
Status: COMPLETED
Start: 2018-07-12 | End: 2018-07-12

## 2018-07-12 RX ADMIN — EPHEDRINE SULFATE 10 MG: 50 INJECTION INTRAVENOUS at 12:13

## 2018-07-12 RX ADMIN — Medication 125 ML/HR: at 16:38

## 2018-07-12 RX ADMIN — SODIUM CHLORIDE, POTASSIUM CHLORIDE, SODIUM LACTATE AND CALCIUM CHLORIDE: 600; 310; 30; 20 INJECTION, SOLUTION INTRAVENOUS at 12:15

## 2018-07-12 RX ADMIN — EPHEDRINE SULFATE 5 MG: 50 INJECTION INTRAVENOUS at 10:43

## 2018-07-12 RX ADMIN — SODIUM CHLORIDE, POTASSIUM CHLORIDE, SODIUM LACTATE AND CALCIUM CHLORIDE: 600; 310; 30; 20 INJECTION, SOLUTION INTRAVENOUS at 07:09

## 2018-07-12 RX ADMIN — EPHEDRINE SULFATE 5 MG: 50 INJECTION INTRAVENOUS at 10:11

## 2018-07-12 RX ADMIN — SODIUM CHLORIDE, POTASSIUM CHLORIDE, SODIUM LACTATE AND CALCIUM CHLORIDE 1000 ML: 600; 310; 30; 20 INJECTION, SOLUTION INTRAVENOUS at 08:21

## 2018-07-12 RX ADMIN — IBUPROFEN 600 MG: 600 TABLET, FILM COATED ORAL at 19:07

## 2018-07-12 RX ADMIN — Medication 1 MILLI-UNITS/MIN: at 07:09

## 2018-07-12 RX ADMIN — OXYCODONE HYDROCHLORIDE AND ACETAMINOPHEN 2 TABLET: 5; 325 TABLET ORAL at 19:07

## 2018-07-12 RX ADMIN — ROPIVACAINE HYDROCHLORIDE 100 ML: 2 INJECTION, SOLUTION EPIDURAL; INFILTRATION at 09:28

## 2018-07-12 ASSESSMENT — PAIN SCALES - GENERAL
PAINLEVEL_OUTOF10: 0
PAINLEVEL_OUTOF10: 0
PAINLEVEL_OUTOF10: 7
PAINLEVEL_OUTOF10: 0
PAINLEVEL_OUTOF10: 0

## 2018-07-12 ASSESSMENT — PATIENT HEALTH QUESTIONNAIRE - PHQ9
1. LITTLE INTEREST OR PLEASURE IN DOING THINGS: NOT AT ALL
2. FEELING DOWN, DEPRESSED, IRRITABLE, OR HOPELESS: NOT AT ALL
2. FEELING DOWN, DEPRESSED, IRRITABLE, OR HOPELESS: NOT AT ALL
1. LITTLE INTEREST OR PLEASURE IN DOING THINGS: NOT AT ALL
SUM OF ALL RESPONSES TO PHQ9 QUESTIONS 1 AND 2: 0
SUM OF ALL RESPONSES TO PHQ9 QUESTIONS 1 AND 2: 0

## 2018-07-12 ASSESSMENT — COPD QUESTIONNAIRES
DO YOU EVER COUGH UP ANY MUCUS OR PHLEGM?: NO/ONLY WITH OCCASIONAL COLDS OR INFECTIONS
DURING THE PAST 4 WEEKS HOW MUCH DID YOU FEEL SHORT OF BREATH: NONE/LITTLE OF THE TIME
HAVE YOU SMOKED AT LEAST 100 CIGARETTES IN YOUR ENTIRE LIFE: NO/DON'T KNOW
IN THE PAST 12 MONTHS DO YOU DO LESS THAN YOU USED TO BECAUSE OF YOUR BREATHING PROBLEMS: DISAGREE/UNSURE

## 2018-07-12 ASSESSMENT — LIFESTYLE VARIABLES: ALCOHOL_USE: NO

## 2018-07-12 NOTE — PROGRESS NOTES
"1245 - report from LEOBARDO Quinteros RN. Friend Laney at bedside. POC discussed, reeducated on hand hygiene, call light, emergency light, [employer] and [employer]. Questions encouraged and answered, understanding verbalized.  1305 - call from Dr. Griffin, update on labor status given.   1355 - reporting pressure, requesting to be checked, SVE 7/80/-1.   1430 - reporting pressure, SVE 7-8/80/-1.  1445 - reporting pressure, SVE lip/80/0  1447 - call to Dr. Griffin.   1520 - Dr. Griffin at bedside for delivery.   1533 -  of viable female infant, \"Amaia\", 7/9 apgars, 3VC. 90 second shoulder dystocia (left shoulder anterior), resolved with Sarah and woodscrew.   1536 - spontaneous delivery of intact placenta.   1600 - pt and family educated on shoulder dystocia, importance of informing provider in future pregnancies, understanding verbalized.   1800 - RN heading to bedside to assist pt to bathroom, pt's aunt found in hallway with infant tucked inside her jacket. Educated pt and family not to walk with infant in halls, danger of doing so, understanding verbalized.  Despite being educated not to do so, had already gotten herself up with assistance from family since last care at 1730. States she was not weak/dizzy, was able to void. Maria Alejandra care provided and taught, understanding verbalized.   1815 - transferred to postpartum via wheelchair in stable condition with infant in arms, understanding verbalized.   "

## 2018-07-12 NOTE — CARE PLAN
Problem: Pain  Goal: Alleviation of Pain or a reduction in pain to the patient's comfort goal  Outcome: PROGRESSING AS EXPECTED  Reports excellent pain relief with epidural analgesia.     Problem: Risk for Infection, Impaired Wound Healing  Goal: Remain free from signs and symptoms of infection  Outcome: PROGRESSING AS EXPECTED  VSS, no s/s of infection noted upon assessment.

## 2018-07-12 NOTE — PROGRESS NOTES
0630 - 21 y/o  EDC , EGA 40.1, here to L&D room 215 with friend Laney. Pt here for scheduled IOL - elective. EFM/TOCO applied, Patient states positive FM. Denies vaginal LOF or Bleeding. VSS.  0640 - IV started, labs collected, admit profile completed. FOB not involved, pt states he is incarcerated. Pt's friend Laney at bedside for support, per pt Laney will get the fourth band.  0650 - SVE - 5/70/-1.  0700 - Call placed to Dr Griffin, orders received for pitocin induction.  0756 - Dr Griffin to bedside, strip reviewed. SVE - 5-6 cm. AROM - scant fluid.  0820 - Pt requesting epidural. LR bolus started. Consents signed.  09 - Dr Hull at bedside for epidural placement. VSS  0926 - Pt lying on left side, RN having difficult time picking up heart tones. Fetal scalp electrode placed. Pt and family educated. Pt feeling nauseous, BP 88/52. Dr Hull at bedside, ephedrine given.   0940 - Pt denying any pain with UC's. Devi placed. SVE - 6-7/80/-1.   1003 - Pt feeling nauseous. BP 88/54. LR bolus continued. Ephedrine x2 given, per MD order. Dr Hull aware and orders received to decrease epidural rate from 8cc to 6cc/hour. Pt feeling better and BP increased after interventions. Pt educated to notify RN if feeling nauseous or lightheaded.   1200 - Pt having hypotensive BP's 80's/50's. Pt asymptomatic, FHT's - Category I tracing. Discussed BP's with Dr Hull. Per MD, hold off on another dose of ephedrine and decrease continuously epidural rate from 6cc to 5cc/hour. If pt becomes symptomatic, give 10mg IV ephedrine.   1215 - Pt feeling lightheaded. BP continues to be low. IV ephedrine given, See MAR.   1240 - Pt feeling pressure with UC's. SVE - 7/80/-1.   1245 - Report given to ORLIN Singh RN. All questions answered.

## 2018-07-12 NOTE — H&P
CHIEF COMPLAINT:  Patient presents for induction.    HISTORY OF PRESENT ILLNESS:  Patient is a 22-year-old female, para 2, whose   estimated due date is 07/11 based on a 23-week ultrasound making her 40 weeks   and 1 day.  Her cervix was favorable at 5 cm.    PAST MEDICAL HISTORY:  Benign.    PAST SURGICAL HISTORY:  Include tonsillectomy.    ALLERGIES:  None.    HABITS:  The patient denies tobacco, alcohol or drug use.    OBSTETRICAL HISTORY:  OB care has been complicated by chlamydia, which was   treated twice in the pregnancy.  Patient is Rh positive, rubella immune.    PHYSICAL EXAMINATION:  VITAL SIGNS:  Normal.  GENERAL:  Patient is awake, alert, pleasant.  HEENT:  Head is atraumatic, normocephalic.  ABDOMEN:  Gravid, estimated fetal weight is 7-1/2 pounds.  PELVIC:  Cervix is 5 cm.  Amniotic sac was ruptured.  Fluid was clear.  Baby   was in a vertex presentation.  EXTREMITIES:  Normal.    ASSESSMENT:  1.  Term pregnancy.  2.  Advanced dilation.    PLAN:  Expectant management.  Pros and cons of induction have been discussed   with patient.       ____________________________________     MD GRETA CASTELLANOS / NTS    DD:  07/12/2018 15:50:06  DT:  07/12/2018 16:03:08    D#:  [id number]  Job#:  [id number]

## 2018-07-12 NOTE — DELIVERY NOTE
DATE OF SERVICE:  07/12/2018    DIAGNOSES:  1.  Term pregnancy.  2.  Delivery of viable male, Apgars 7 and 9.    PROCEDURE:  Induction with vaginal delivery.    HOSPITAL COURSE:  Patient is a 22-year-old female, para 2, whose estimated due   date of 07/12 based on a 23-week ultrasound.  She presented for induction.    Her cervix was 5 cm.  Patient was begun on Pitocin, her amniotic sac was   ruptured.  She received an epidural, progressed normally complete, pushed for   about 10 minutes, delivered a viable female over intact perineum.  There was a   mild-to-moderate shoulder dystocia relieved by Sarah with corkscrew   maneuver.  The anterior arm followed by posterior arms were delivered.    Remainder of baby delivered without complication.  Baby was vigorous, moving   all extremities.  Cord was clamped, cut.  Baby was handed off to mom with   nurse present.  Apgars were 7 and 9.  Placenta delivered spontaneously intact.    Exam of uterus revealed no retained placenta.  Estimated blood loss was   about 150 mL.  There were no tears.       ____________________________________     MD GRETA CASTELLANOS / NTS    DD:  07/12/2018 15:48:10  DT:  07/12/2018 15:58:01    D#:  [id number]  Job#:  [id number]

## 2018-07-12 NOTE — CARE PLAN
Problem: Knowledge Deficit  Goal: Patient/Support person demonstrates understanding regarding the progression of labor, available options and participates in decision-making process  Outcome: PROGRESSING AS EXPECTED  POC discussed with and pt and pt's support person, pt aware of plan and all questions answered.     Problem: Pain  Goal: Alleviation of Pain or a reduction in pain to the patient's comfort goal  Outcome: PROGRESSING AS EXPECTED  Pain assessment done hourly and throughout shift as needed. Pharmacologic and non-pharmacolic measures in place.

## 2018-07-13 PROCEDURE — A9270 NON-COVERED ITEM OR SERVICE: HCPCS | Performed by: OBSTETRICS & GYNECOLOGY

## 2018-07-13 PROCEDURE — 770002 HCHG ROOM/CARE - OB PRIVATE (112)

## 2018-07-13 PROCEDURE — 700102 HCHG RX REV CODE 250 W/ 637 OVERRIDE(OP): Performed by: OBSTETRICS & GYNECOLOGY

## 2018-07-13 RX ADMIN — OXYCODONE HYDROCHLORIDE AND ACETAMINOPHEN 1 TABLET: 5; 325 TABLET ORAL at 22:59

## 2018-07-13 RX ADMIN — Medication 1 TABLET: at 09:28

## 2018-07-13 RX ADMIN — IBUPROFEN 600 MG: 600 TABLET, FILM COATED ORAL at 01:38

## 2018-07-13 RX ADMIN — IBUPROFEN 600 MG: 600 TABLET, FILM COATED ORAL at 20:17

## 2018-07-13 RX ADMIN — IBUPROFEN 600 MG: 600 TABLET, FILM COATED ORAL at 09:28

## 2018-07-13 RX ADMIN — OXYCODONE HYDROCHLORIDE AND ACETAMINOPHEN 1 TABLET: 5; 325 TABLET ORAL at 00:12

## 2018-07-13 RX ADMIN — OXYCODONE HYDROCHLORIDE AND ACETAMINOPHEN 1 TABLET: 5; 325 TABLET ORAL at 09:28

## 2018-07-13 ASSESSMENT — PAIN SCALES - GENERAL
PAINLEVEL_OUTOF10: 4
PAINLEVEL_OUTOF10: 6
PAINLEVEL_OUTOF10: 6
PAINLEVEL_OUTOF10: 0
PAINLEVEL_OUTOF10: 6
PAINLEVEL_OUTOF10: 6

## 2018-07-13 NOTE — PROGRESS NOTES
"Assumed care of pt at 1830. Bands verified. Pt oriented to room and use of call light. Discussed what to do in case of emergency. Pads, pain relief spray and ice pack provided to pt. RN discussed safety rules regarding baby, pt understanding. Per pt request formula was provided and RN supplied pt with supplementation form. RN educated pt about infant being \"bagged\". RN requested that pt leave diapers on baby and allow staff to change baby in order to collect urine sample, pt understanding. All needs met at this time. Pt declines need for pain medications at this moment.  "

## 2018-07-13 NOTE — PROGRESS NOTES
PPD#1  S) pt without c/o  O) vss  Fundus; firm  Ext; no edema  Hgb: 10.8  A/P PPD#1, s/p   Stable, continue orders

## 2018-07-13 NOTE — CARE PLAN
Problem: Potential for postpartum infection related to presence of episiotomy/vaginal tear and/or uterine contamination  Goal: Patient will be absent from signs and symptoms of infection  Outcome: PROGRESSING AS EXPECTED  No signs of infection noted,

## 2018-07-13 NOTE — CARE PLAN
Problem: Altered physiologic condition related to immediate post-delivery state and potential for bleeding/hemorrhage  Goal: Patient physiologically stable as evidenced by normal lochia, palpable uterine involution and vital signs within normal limits  Outcome: PROGRESSING AS EXPECTED  Patient has light lochia with a firm palpable uterus.  Vital signs are within defined limits.  Assessment will continue.     Problem: Alteration in comfort related to episiotomy, vaginal repair and/or after birth pains  Goal: Patient verbalizes acceptable pain level  Outcome: PROGRESSING AS EXPECTED  Patient will ask for pain medication when needed.  Pain assessment will continue.

## 2018-07-13 NOTE — DISCHARGE PLANNING
Discharge Planning Assessment Post Partum    Reason for Referral: Hx of meth use  Address: [address] [place] [address] [place]  Phone number: [phone]  Type of Living Situation:Lives with grandparents  Mom Diagnosis: Pregnancy  Baby Diagnosis: Birth  Primary Language: English    Name of Baby: Sandoval Angeles  Father of the Baby: Caleb Angeles  Involved in baby’s care? Yes, FOB is currently in skilled nursing, MOB reports he will be involved  Contact Information: None provided    Prenatal Care: MOB reported yes  Mom's PCP: None  PCP for new baby:None list provided    Support System: Yes, family  Coping/Bonding between mother & baby: Yes  Source of Feeding: Formula  Supplies for Infant: Prepared    Mom's Insurance: Medicaid  Baby Covered on Insurance:Yes  Mother Employed/School: Not employed  Other children in the home/names & ages: 8 Y - Weston, 5 Y - Divana     Financial Hardship/Income: MOB reports family assisting financially at this time   Mom's Mental status: A&OX4  Services used prior to admit: Food stamps    CPS History: Yes, investigation due to bruise on son, then case was dropped after first visit  Psychiatric History: None  Domestic Violence History: None  Drug/ETOH History: Meth - MOB reports quitting prior to pregnancy    Resources Provided: WIC, Diaper bank, Resource list  Referrals Made: None     Clearance for Discharge: Infant cleared to discharge home with MOB.    Ongoing Plan: No additional discharge needs at this time. LSW to follow as needed.

## 2018-07-14 VITALS
OXYGEN SATURATION: 96 % | HEART RATE: 68 BPM | WEIGHT: 167 LBS | SYSTOLIC BLOOD PRESSURE: 98 MMHG | RESPIRATION RATE: 20 BRPM | BODY MASS INDEX: 32.79 KG/M2 | HEIGHT: 60 IN | TEMPERATURE: 97.8 F | DIASTOLIC BLOOD PRESSURE: 68 MMHG

## 2018-07-14 PROBLEM — R52 PAIN RELATED TO VAGINAL DELIVERY: Status: ACTIVE | Noted: 2018-07-14

## 2018-07-14 PROCEDURE — A9270 NON-COVERED ITEM OR SERVICE: HCPCS | Performed by: OBSTETRICS & GYNECOLOGY

## 2018-07-14 PROCEDURE — 700102 HCHG RX REV CODE 250 W/ 637 OVERRIDE(OP): Performed by: OBSTETRICS & GYNECOLOGY

## 2018-07-14 RX ORDER — IBUPROFEN 600 MG/1
600 TABLET ORAL EVERY 6 HOURS PRN
Qty: 30 TAB | Refills: 0 | Status: SHIPPED | OUTPATIENT
Start: 2018-07-14 | End: 2020-05-31

## 2018-07-14 RX ORDER — OXYCODONE HYDROCHLORIDE AND ACETAMINOPHEN 5; 325 MG/1; MG/1
1 TABLET ORAL EVERY 4 HOURS PRN
Qty: 12 TAB | Refills: 0 | Status: SHIPPED | OUTPATIENT
Start: 2018-07-14 | End: 2018-07-16

## 2018-07-14 RX ADMIN — OXYCODONE HYDROCHLORIDE AND ACETAMINOPHEN 1 TABLET: 5; 325 TABLET ORAL at 03:52

## 2018-07-14 RX ADMIN — IBUPROFEN 600 MG: 600 TABLET, FILM COATED ORAL at 03:52

## 2018-07-14 ASSESSMENT — PAIN SCALES - GENERAL
PAINLEVEL_OUTOF10: 0
PAINLEVEL_OUTOF10: 6
PAINLEVEL_OUTOF10: 0
PAINLEVEL_OUTOF10: 0
PAINLEVEL_OUTOF10: 2

## 2018-07-14 NOTE — CARE PLAN
Problem: Communication  Goal: The ability to communicate needs accurately and effectively will improve  Outcome: PROGRESSING AS EXPECTED  Patient ambulating well,taking adequate PO fluids and voiding. Bottle feeding infant appropriately. All questions and concerns answered.     Problem: Pain Management  Goal: Pain level will decrease to patient's comfort goal  Outcome: PROGRESSING AS EXPECTED  Patient would like to call for PRN pain medication when needed.

## 2018-07-14 NOTE — DISCHARGE SUMMARY
Discharge Summary    Admission Date: 18    Discharge Date: 18    Diagnosis:  Active Problems:    Pain related to vaginal delivery     (spontaneous vaginal delivery)    Subjective: Pain controlled with narcotic and motrin. Normal lochia. Bottle feeding. Eating and voiding without difficulty.     /71   Pulse 61   Temp 36.8 °C (98.3 °F)   Resp 16   Ht 1.524 m (5')   Wt 75.8 kg (167 lb)   SpO2 98%   Breastfeeding? No   BMI 32.61 kg/m²     GEN: NAD  GI:soft, NT, ND  :fundus firm and below umbilicus  EXT: no edema    Hospital Course: Rosana Rick is a 23 yo  admitted at term for induction of labor. She underwent  of a viable male infant with APGARS 7/9. EBL 150cc. Perineum intact. Delivery complicated by shoulder dystocia. She was meeting all post partum goals and ready for discharge home on PPD #2.     Discharge Instructions   1. Diet : general  2. Activity : pelvic rest for 6 weeks     Rosana Rick   Home Medication Instructions SID:[id number]    Printed on:18 0546   Medication Information                      ibuprofen (MOTRIN) 600 MG Tab  Take 1 Tab by mouth every 6 hours as needed (Cramping).             oxyCODONE-acetaminophen (PERCOCET) 5-325 MG Tab  Take 1 Tab by mouth every four hours as needed ((Pain Scale 4-6); If acetaminophen ineffective) for up to 2 days.             Prenatal MV-Min-Fe Fum-FA-DHA (PRENATAL 1 PO)  Take  by mouth.                 Follow up: 6 weeks with Dr Griffin    Complications:none    Bronwyn Valentin M.D.

## 2018-07-14 NOTE — PROGRESS NOTES
0705-Report received at bedside from Sheryl HAN, assumed care of patient.  Encouraged to call with needs, denies at this time.   0810-Assessment completed, fundus is firm, lochia light and vital signs within defined parameters. Patient is ambulating and voiding without difficulty. Bonding well with infant, bottle feeding independently.  Discussed with patient pain medication plan, patient requesting to be medicated PRN, will call for medication.   Plan of care discussed, patient verbalized understanding. Hourly rounding implemented, call light within reach.

## 2018-07-14 NOTE — DISCHARGE INSTRUCTIONS
POSTPARTUM DISCHARGE INSTRUCTIONS FOR MOM    YOB: 1995   Age: 22 y.o.               Admit Date: 7/12/2018     Discharge Date: 7/14/2018  Attending Doctor:  Parvin Griffin M.D.                  Allergies:  Nkda [no known drug allergy]    Discharged to home by car. Discharged via wheelchair, hospital escort: Yes.  Special equipment needed: Not Applicable  Belongings with: Personal  Be sure to schedule a follow-up appointment with your primary care doctor or any specialists as instructed.     Discharge Plan:   Diet Plan: Discussed  Activity Level: Discussed  Smoking Cessation Offered: Patient Refused  Confirmed Follow up Appointment: Appointment Scheduled  Confirmed Symptoms Management: Discussed  Medication Reconciliation Updated: Yes  Influenza Vaccine Indication: Indicated: Not available from distributor/    REASONS TO CALL YOUR OBSTETRICIAN:  1.   Persistent fever or shaking chills (Temperature higher than 100.4)  2.   Heavy bleeding (soaking more than 1 pad per hour); Passing clots  3.   Foul odor from vagina  4.   Mastitis (Breast infection; breast pain, chills, fever, redness)  5.   Urinary pain, burning or frequency  6.   Episiotomy infection  7.   Abdominal incision infection  8.   Severe depression longer than 24 hours    HAND WASHING  · Prior to handling the baby.  · Before breastfeeding or bottle feeding baby.  · After using the bathroom or changing the baby's diaper.    VAGINAL CARE  · Nothing inside vagina for 6 weeks: no sexual intercourse, tampons or douching.  · Bleeding may continue for 2-4 weeks.  Amount may vary.    · Call your physician for heavy bleeding which means soaking more than 1 pad per hour    BIRTH CONTROL  · It is possible to become pregnant at any time after delivery and while breastfeeding.  · Plan to discuss a method of birth control with your physician at your follow up visit. visit.    DIET AND ELIMINATION  · Eating more fiber (bran cereal, fruits, and  "vegetables) and drinking plenty of fluids will help to avoid constipation.  · Urinary frequency after childbirth is normal.    POSTPARTUM BLUES  During the first few days after birth, you may experience a sense of the \"blues\" which may include impatience, irritability or even crying.  These feeling come and go quickly.  However, as many as 1 in 10 women experience emotional symptoms known as postpartum depression.    Postpartum depression:  May start as early as the second or third day after delivery or take several weeks or months to develop.  Symptoms of \"blues\" are present, but are more intense:  Crying spells; loss of appetite; feelings of hopelessness or loss of control; fear of touching the baby; over concern or no concern at all about the baby; little or no concern about your own appearance/caring for yourself; and/or inability to sleep or excessive sleeping.  Contact your physician if you are experiencing any of these symptoms.    Crisis Hotline:  · [place] Crisis Hotline:  5-749-YZDUBMM  Or 1-289.764.1389  · [place] Crisis Hotline:  1-977.735.5253  Or 080-996-6314    PREVENTING SHAKEN BABY:  If you are angry or stressed, PUT THE BABY IN THE CRIB, step away, take some deep breaths, and wait until you are calm to care for the baby.  DO NOT SHAKE THE BABY.  You are not alone, call a supporter for help.    · Crisis Call Center 24/7 crisis line 777-946-8958 or 1-118.789.9973  · You can also text them, text \"ANSWER\" to 022766    QUIT SMOKING/TOBACCO USE:  I understand the use of any tobacco products increases my chance of suffering from future heart disease and could cause other illnesses which may shorten my life. Quitting the use of tobacco products is the single most important thing I can do to improve my health. For further information on smoking / tobacco cessation call a Toll Free Quit Line at 1-303.808.2330 (*[employer] [place]) or 1-591.217.9827 (American Lung Association) or you can access the web " based program at www.lungusa.org.    · [place] Tobacco Users Help Line:  (887) 204-5464       Toll Free: 1-258.776.3187  · Quit Tobacco Program [facility] (634)665-8501    DEPRESSION / SUICIDE RISK:  As you are discharged from this [facility], it is important to learn how to keep safe from harming yourself.    Recognize the warning signs:  · Abrupt changes in personality, positive or negative- including increase in energy   · Giving away possessions  · Change in eating patterns- significant weight changes-  positive or negative  · Change in sleeping patterns- unable to sleep or sleeping all the time   · Unwillingness or inability to communicate  · Depression  · Unusual sadness, discouragement and loneliness  · Talk of wanting to die  · Neglect of personal appearance   · Rebelliousness- reckless behavior  · Withdrawal from people/activities they love  · Confusion- inability to concentrate     If you or a loved one observes any of these behaviors or has concerns about self-harm, here's what you can do:  · Talk about it- your feelings and reasons for harming yourself  · Remove any means that you might use to hurt yourself (examples: pills, rope, extension cords, firearm)  · Get professional help from the community (Mental Health, Substance Abuse, psychological counseling)  · Do not be alone:Call your Safe Contact- someone whom you trust who will be there for you.  · Call your local CRISIS HOTLINE 010-5624 or 403-325-4701  · Call your local Children's Mobile Crisis Response Team [place] (368) 052-4414 or www.[employer]  · Call the toll free National Suicide Prevention Hotlines   · National Suicide Prevention Lifeline 448-616-AGLW (2361)  · National Hope Line Network 800-SUICIDE (348-0487)    DISCHARGE SURVEY:  Thank you for choosing [facility].  We hope we provided you with very good care.  You may be receiving a survey in the mail.  Please fill it out.  Your opinion is  valuable to us.    ADDITIONAL EDUCATIONAL MATERIALS GIVEN TO PATIENT:        My signature on this form indicates that:  1.  I have reviewed and understand the above information  2.  My questions regarding this information have been answered to my satisfaction.  3.  I have formulated a plan with my discharge nurse to obtain my prescribed medication for home.

## 2018-07-14 NOTE — PROGRESS NOTES
Reviewed discharge paperwork with patient, patient verbalized understanding of follow up appointments and discharge education. Patient escorted out by transport.

## 2018-11-16 ENCOUNTER — HOSPITAL ENCOUNTER (EMERGENCY)
Dept: HOSPITAL 8 - ED | Age: 23
Discharge: HOME | End: 2018-11-16
Payer: COMMERCIAL

## 2018-11-16 VITALS — SYSTOLIC BLOOD PRESSURE: 115 MMHG | DIASTOLIC BLOOD PRESSURE: 79 MMHG

## 2018-11-16 VITALS — BODY MASS INDEX: 24.34 KG/M2 | HEIGHT: 62 IN | WEIGHT: 132.28 LBS

## 2018-11-16 DIAGNOSIS — N76.0: Primary | ICD-10-CM

## 2018-11-16 DIAGNOSIS — N30.90: ICD-10-CM

## 2018-11-16 DIAGNOSIS — B96.89: ICD-10-CM

## 2018-11-16 LAB
CLUE CELLS: (no result)
CULTURE INDICATED?: YES
HCG UR SG: > 1.03 (ref 1–1.03)
MICROSCOPIC: (no result)
T VAGINALIS RRNA GENITAL QL PROBE: (no result)
WET PREP WBCS: (no result)

## 2018-11-16 PROCEDURE — 96372 THER/PROPH/DIAG INJ SC/IM: CPT

## 2018-11-16 PROCEDURE — 81001 URINALYSIS AUTO W/SCOPE: CPT

## 2018-11-16 PROCEDURE — 87808 TRICHOMONAS ASSAY W/OPTIC: CPT

## 2018-11-16 PROCEDURE — 99284 EMERGENCY DEPT VISIT MOD MDM: CPT

## 2018-11-16 PROCEDURE — 87591 N.GONORRHOEAE DNA AMP PROB: CPT

## 2018-11-16 PROCEDURE — 87186 SC STD MICRODIL/AGAR DIL: CPT

## 2018-11-16 PROCEDURE — 81025 URINE PREGNANCY TEST: CPT

## 2018-11-16 PROCEDURE — 87077 CULTURE AEROBIC IDENTIFY: CPT

## 2018-11-16 PROCEDURE — 87086 URINE CULTURE/COLONY COUNT: CPT

## 2018-11-16 PROCEDURE — 87210 SMEAR WET MOUNT SALINE/INK: CPT

## 2018-11-16 PROCEDURE — 87491 CHLMYD TRACH DNA AMP PROBE: CPT

## 2019-03-23 ENCOUNTER — OFFICE VISIT (OUTPATIENT)
Dept: URGENT CARE | Facility: CLINIC | Age: 24
End: 2019-03-23
Payer: MEDICAID

## 2019-03-23 VITALS
BODY MASS INDEX: 32.79 KG/M2 | WEIGHT: 167 LBS | TEMPERATURE: 98.2 F | RESPIRATION RATE: 13 BRPM | HEIGHT: 60 IN | OXYGEN SATURATION: 98 % | HEART RATE: 118 BPM | SYSTOLIC BLOOD PRESSURE: 112 MMHG | DIASTOLIC BLOOD PRESSURE: 68 MMHG

## 2019-03-23 DIAGNOSIS — N93.9 VAGINAL BLEEDING: Primary | ICD-10-CM

## 2019-03-23 DIAGNOSIS — Z98.890 STATUS POST ELECTIVE ABORTION: ICD-10-CM

## 2019-03-23 PROCEDURE — 99204 OFFICE O/P NEW MOD 45 MIN: CPT | Performed by: NURSE PRACTITIONER

## 2019-03-23 RX ORDER — SULFAMETHOXAZOLE AND TRIMETHOPRIM 800; 160 MG/1; MG/1
TABLET ORAL
Refills: 0 | COMMUNITY
Start: 2019-01-14 | End: 2020-05-31

## 2019-03-23 ASSESSMENT — ENCOUNTER SYMPTOMS
CONSTITUTIONAL NEGATIVE: 1
ABDOMINAL PAIN: 1
CARDIOVASCULAR NEGATIVE: 1
EASY BRUISING: 1
FEVER: 0
NEUROLOGICAL NEGATIVE: 1
SENSORY CHANGE: 0
NAUSEA: 0
SHORTNESS OF BREATH: 0
WEAKNESS: 0
DIZZINESS: 0
RESPIRATORY NEGATIVE: 1
VOMITING: 0

## 2019-03-24 NOTE — PROGRESS NOTES
Subjective:     Rosana Rick is a 23 y.o. female who presents for Menorrhagia ( 3 days ago)       Bleeding/Bruising   This is a new problem. The current episode started today. The problem has been gradually worsening. Associated symptoms include abdominal pain (lower cramping). Pertinent negatives include no fever, nausea, vomiting or weakness.   Patient reports that 3 days ago she was out of [place] and had an elective  at [facility] performed while she was 12 weeks pregnant.  She reports worsening vaginal bleeding with 4 pad changes daily.  She is also reporting some abdominal cramping.  She has an OB/GYN but does not currently have an appointment.    PMH:  has no past medical history of Anesthesia.    MEDS:   Current Outpatient Prescriptions:   •  sulfamethoxazole-trimethoprim (BACTRIM DS) 800-160 MG tablet, TK 1 T PO BID, Disp: , Rfl: 0  •  ibuprofen (MOTRIN) 600 MG Tab, Take 1 Tab by mouth every 6 hours as needed (Cramping). (Patient not taking: Reported on 3/23/2019), Disp: 30 Tab, Rfl: 0  •  Prenatal MV-Min-Fe Fum-FA-DHA (PRENATAL 1 PO), Take  by mouth., Disp: , Rfl:     ALLERGIES:   Allergies   Allergen Reactions   • Nkda [No Known Drug Allergy]      SURGHX: No past surgical history on file.    SOCHX:  reports that she has quit smoking. She has never used smokeless tobacco. She reports that she drinks alcohol. She reports that she uses drugs.     FH: Reviewed with patient, not pertinent to this visit.     Review of Systems   Constitutional: Negative.  Negative for fever and malaise/fatigue.   Respiratory: Negative.  Negative for shortness of breath.    Cardiovascular: Negative.    Gastrointestinal: Positive for abdominal pain (lower cramping). Negative for nausea and vomiting.   Genitourinary: Negative.         Vaginal bleeding   Neurological: Negative.  Negative for dizziness, sensory change and weakness.   All other systems reviewed and are negative.    Objective:     /68    Pulse (!) 118   Temp 36.8 °C (98.2 °F) (Temporal)   Resp 13   Ht 1.524 m (5')   Wt 75.8 kg (167 lb)   SpO2 98%   BMI 32.61 kg/m²     Physical Exam   Constitutional: She is oriented to person, place, and time. She appears well-developed and well-nourished. She is cooperative.  Non-toxic appearance. No distress.   HENT:   Head: Normocephalic and atraumatic.   Right Ear: External ear normal.   Left Ear: External ear normal.   Nose: Nose normal.   Mouth/Throat: Mucous membranes are normal.   Eyes: Conjunctivae and EOM are normal.   Neck: Normal range of motion.   Cardiovascular: Regular rhythm, normal heart sounds and normal pulses.  Tachycardia present.    Pulmonary/Chest: Effort normal and breath sounds normal. No respiratory distress. She has no decreased breath sounds.   Abdominal: Soft. Bowel sounds are normal. There is tenderness in the suprapubic area.   Musculoskeletal: Normal range of motion. She exhibits no deformity.   Neurological: She is alert and oriented to person, place, and time. She has normal strength. No sensory deficit.   Skin: Skin is warm, dry and intact. Capillary refill takes less than 2 seconds.   Psychiatric: She has a normal mood and affect. Her behavior is normal.   Vitals reviewed.       Assessment/Plan:     1. Vaginal bleeding  - US-PELVIC COMPLETE (TRANSABDOMINAL/TRANSVAGINAL) (COMBO); Future    2. Status post elective   - US-PELVIC COMPLETE (TRANSABDOMINAL/TRANSVAGINAL) (COMBO); Future    Ultrasound pending. /68, . Patient states she has been tolerating PO and has been increasing fluids. Denies lightheadedness, dizziness, or weakness. Pt counseled on warning signs and advised of strict ER precautions if symptoms worsen. Advised to follow up with OB/GYN.    Patient advised to: Return for 1) Symptoms don't improve or worsen, or go to ER, 2) Follow up with primary care in 7-10 days.    Differential diagnosis, natural history, supportive care, and indications for  immediate follow-up discussed. All questions answered. Patient agrees with the plan of care.

## 2020-05-31 ENCOUNTER — HOSPITAL ENCOUNTER (EMERGENCY)
Facility: MEDICAL CENTER | Age: 25
End: 2020-05-31
Attending: EMERGENCY MEDICINE
Payer: MEDICAID

## 2020-05-31 VITALS
HEIGHT: 60 IN | TEMPERATURE: 97.9 F | DIASTOLIC BLOOD PRESSURE: 60 MMHG | HEART RATE: 89 BPM | OXYGEN SATURATION: 98 % | BODY MASS INDEX: 26.66 KG/M2 | RESPIRATION RATE: 20 BRPM | SYSTOLIC BLOOD PRESSURE: 98 MMHG | WEIGHT: 135.8 LBS

## 2020-05-31 DIAGNOSIS — N39.0 ACUTE UTI: ICD-10-CM

## 2020-05-31 LAB
APPEARANCE UR: ABNORMAL
BACTERIA #/AREA URNS HPF: ABNORMAL /HPF
BILIRUB UR QL STRIP.AUTO: NEGATIVE
COLOR UR: YELLOW
EPI CELLS #/AREA URNS HPF: ABNORMAL /HPF
GLUCOSE UR STRIP.AUTO-MCNC: NEGATIVE MG/DL
HCG UR QL: NEGATIVE
HYALINE CASTS #/AREA URNS LPF: ABNORMAL /LPF
KETONES UR STRIP.AUTO-MCNC: NEGATIVE MG/DL
LEUKOCYTE ESTERASE UR QL STRIP.AUTO: ABNORMAL
MICRO URNS: ABNORMAL
NITRITE UR QL STRIP.AUTO: NEGATIVE
PH UR STRIP.AUTO: 6.5 [PH] (ref 5–8)
PROT UR QL STRIP: 30 MG/DL
RBC # URNS HPF: ABNORMAL /HPF
RBC UR QL AUTO: ABNORMAL
SP GR UR STRIP.AUTO: 1.02
UROBILINOGEN UR STRIP.AUTO-MCNC: 0.2 MG/DL
WBC #/AREA URNS HPF: ABNORMAL /HPF

## 2020-05-31 PROCEDURE — 87077 CULTURE AEROBIC IDENTIFY: CPT | Mod: EDC

## 2020-05-31 PROCEDURE — 81025 URINE PREGNANCY TEST: CPT | Mod: EDC

## 2020-05-31 PROCEDURE — A9270 NON-COVERED ITEM OR SERVICE: HCPCS | Mod: EDC | Performed by: EMERGENCY MEDICINE

## 2020-05-31 PROCEDURE — 87186 SC STD MICRODIL/AGAR DIL: CPT | Mod: EDC

## 2020-05-31 PROCEDURE — 99283 EMERGENCY DEPT VISIT LOW MDM: CPT | Mod: EDC

## 2020-05-31 PROCEDURE — 87086 URINE CULTURE/COLONY COUNT: CPT | Mod: EDC

## 2020-05-31 PROCEDURE — 700102 HCHG RX REV CODE 250 W/ 637 OVERRIDE(OP): Mod: EDC | Performed by: EMERGENCY MEDICINE

## 2020-05-31 PROCEDURE — 81001 URINALYSIS AUTO W/SCOPE: CPT | Mod: EDC

## 2020-05-31 RX ORDER — CEFDINIR 300 MG/1
300 CAPSULE ORAL ONCE
Status: COMPLETED | OUTPATIENT
Start: 2020-05-31 | End: 2020-05-31

## 2020-05-31 RX ORDER — PHENAZOPYRIDINE HYDROCHLORIDE 200 MG/1
200 TABLET, FILM COATED ORAL 3 TIMES DAILY PRN
Qty: 6 TAB | Refills: 0 | Status: SHIPPED | OUTPATIENT
Start: 2020-05-31 | End: 2020-06-02

## 2020-05-31 RX ORDER — CEFDINIR 300 MG/1
300 CAPSULE ORAL 2 TIMES DAILY
Qty: 20 CAP | Refills: 0 | Status: SHIPPED | OUTPATIENT
Start: 2020-05-31 | End: 2020-06-07

## 2020-05-31 RX ADMIN — CEFDINIR 300 MG: 300 CAPSULE ORAL at 18:52

## 2020-05-31 SDOH — HEALTH STABILITY: MENTAL HEALTH: HOW OFTEN DO YOU HAVE A DRINK CONTAINING ALCOHOL?: 2-3 TIMES A WEEK

## 2020-05-31 SDOH — HEALTH STABILITY: MENTAL HEALTH: HOW MANY STANDARD DRINKS CONTAINING ALCOHOL DO YOU HAVE ON A TYPICAL DAY?: 1 OR 2

## 2020-05-31 ASSESSMENT — PAIN DESCRIPTION - DESCRIPTORS: DESCRIPTORS: ACHING;SHARP

## 2020-06-01 NOTE — ED PROVIDER NOTES
ED Provider Note    Scribed for Winsome Sumner M.D. by Jose L An. 5/31/2020  5:48 PM    Primary care provider: Pcp Pt States None  Means of arrival: Walk in  History obtained from: Patient  History limited by: none  CHIEF COMPLAINT  Chief Complaint   Patient presents with   • Painful Urination   • Flank Pain     R>L       HPI  Rosana Rick is a 24 y.o. female who presents to the ED for dysuria, hematuria, increased urinary frequency/urgency x1 week with development of low back pain and some mild suprapubic abdominal pain since last night.  Her temperature last night was 99 °F-100 °F, however otherwise no other fevers. She is currently afebrile in the ED. The patient's urinary symptoms started about 5-6 days ago, but she developed back pain yesterday. The back pain is located to bilateral low back, but the pain on the right is worst than the left. She had pain with urination and noticed some blood in her urine as well as some yellow vaginal discharge a week ago.  She also reports frequency and urgency of urination stating that when she tries to use the bathroom, only small amounts come out. The urine is described to be cloudy in color and foul smelling. The patient has developed increased urinary frequency but has had difficulty passing large amounts of urine. She states these symptoms feel similar to her prior episodes of urinary tract infections.  As soon as she noticed UTI symptoms a week ago, she began taking cranberry pills with little to no alleviation of her symptoms.She denies any nausea or vomiting. No recent cough, cold, or runny nose. No diarrhea or constipation. No muscles aches or body aches. She also denies any medical history or regular medication intake. The patient is normally very healthy. The patient denies any chance of pregnancy or chances of having an STD since she only has unprotected sexual intercourse with her fiance. Her last menstrual cycle was 3 weeks ago.     REVIEW OF SYSTEMS  See  HPI for further details.   Pertinent positives include: dysuria, hematuria, increased urinary frequency, abdominal pain, back pain, yellow vaginal discharge, and a low grade fever    Pertinent negatives include: She denies any nausea or vomiting. No recent cough, cold, or runny nose. No diarrhea or constipation. No muscles aches or body aches.      PAST MEDICAL HISTORY  History reviewed. No pertinent past medical history.  No major medical problems.    FAMILY HISTORY  History reviewed. No pertinent family history.    SOCIAL HISTORY  Social History     Tobacco Use   • Smoking status: Former Smoker   • Smokeless tobacco: Never Used   Substance Use Topics   • Alcohol use: Yes     Frequency: 2-3 times a week     Drinks per session: 1 or 2   • Drug use: Not Currently     Comment: denies      Social History     Substance and Sexual Activity   Drug Use Not Currently    Comment: denies       SURGICAL HISTORY  History reviewed. No pertinent surgical history.    CURRENT MEDICATIONS  Home Medications     Reviewed by Diana Belle R.N. (Registered Nurse) on 05/31/20 at 1706  Med List Status: Complete   Medication Last Dose Status        Patient Pal Taking any Medications                       ALLERGIES  Allergies   Allergen Reactions   • Nkda [No Known Drug Allergy]        PHYSICAL EXAM  VITAL SIGNS: BP (!) 97/64   Pulse (!) 102   Temp 37.1 °C (98.8 °F) (Oral)   Resp 18   Ht 1.524 m (5')   Wt 61.6 kg (135 lb 12.9 oz)   LMP 05/10/2020 (Approximate)   SpO2 99%   BMI 26.52 kg/m²      Constitutional: Well developed, well nourished; No acute distress; Non-toxic appearance.   HENT: Normocephalic, atraumatic; Bilateral external ears normal; Oropharynx with moist mucous membranes; No erythema or exudates in the posterior oropharynx.   Eyes: PERRL, EOMI, Conjunctiva normal. No discharge.   Neck:  Supple, nontender midline; No stridor; No nuchal rigidity.   Lymphatic: No cervical lymphadenopathy noted.   Cardiovascular:  Regular rate and rhythm without murmurs, rubs, or gallop.   Thorax & Lungs: No respiratory distress, breath sounds clear to auscultation bilaterally without wheezing, rales or rhonchi. Nontender chest wall. No crepitus or subcutaneous air  Abdomen: Mild, inconsistent tenderness in the suprapubic region and left lower quadrant. Soft, bowel sounds normal. No obvious masses; No pulsatile masses; no rebound, guarding, or peritoneal signs.   Skin: Good color; warm and dry without rash or petechia.  Back: There is some very mild CVA tenderness.   Extremities: Distal dorsalis pedis, posterior tibial pulses are equal bilaterally; No edema; Nontender calves or saphenous, No cyanosis, No clubbing.   Musculoskeletal: Good range of motion in all major joints. No tenderness to palpation or major deformities noted.   Neurologic: Alert & oriented x 4, clear speech    LABS/RADIOLOGY/PROCEDURES  Results for orders placed or performed during the hospital encounter of 05/31/20   URINALYSIS,CULTURE IF INDICATED    Specimen: Urine   Result Value Ref Range    Color Yellow     Character Cloudy (A)     Specific Gravity 1.020 <1.035    Ph 6.5 5.0 - 8.0    Glucose Negative Negative mg/dL    Ketones Negative Negative mg/dL    Protein 30 (A) Negative mg/dL    Bilirubin Negative Negative    Urobilinogen, Urine 0.2 Negative    Nitrite Negative Negative    Leukocyte Esterase Large (A) Negative    Occult Blood Small (A) Negative    Micro Urine Req Microscopic    BETA-HCG QUALITATIVE URINE   Result Value Ref Range    Beta-Hcg Urine Negative Negative   URINE MICROSCOPIC (W/UA)   Result Value Ref Range    WBC Packed (A) /hpf    RBC 5-10 (A) /hpf    Bacteria Few (A) None /hpf    Epithelial Cells Few /hpf    Hyaline Cast 0-2 /lpf           No orders to display       COURSE & MEDICAL DECISION MAKING  Pertinent Labs & Imaging studies reviewed. (See chart for details)      5:48 PM - Patient seen and examined at bedside.  I informed the patient the need  for labs  to rule out any emergent processes. Currently awaiting lab results before deciding if intervention is necessary. Patient will be informed on the results once I have reviewed them. Patient verbalizes understanding and agreement to this plan of care.     6:53 PM-Patient was reevaluated at bedside. Discussed lab results with the patient and informed them on the plan for discharge. Patient was given return precautions and welcomed to return to the ED. Patient understood and verbalized agreement.        Patient presents to the ER complaining of dysuria, some intermittent hematuria, and frequency and urgency of urination with cloudy and foul-smelling urine that began a week ago.  Last night she had a low-grade fever of 99 to 100 °F and developed some low back pain, right more so than the left.  She also noticed some discomfort in her suprapubic region.  She has history of UTIs.  She states that symptoms feel the same.  She is afebrile here in the ER today.  She has some mild inconsistent CVA tenderness on the right.  There is also some mild suprapubic tenderness and mild left lower quadrant tenderness.  She is otherwise well-appearing.  No nausea or vomiting.  She is not pregnant.  Urine pregnancy test is negative.  Her urine, however, reveals quite a lot of WBCs, leukocyte and some blood.  Given her symptoms of dysuria, frequency and urgency of urination, hematuria, with cloudy and foul-smelling urine that began a week ago, I suspect she has a urinary tract infection.  Only last night she developed a little bit of pain in her low back with a low-grade fever.  She is afebrile today.  Vital signs are normal and stable.  She is not septic or toxic.  At this time I think placing her on antibiotic will be helpful.  I do not think she needs blood work at this time as it is really not going to change my management.  I have low suspicion for ureteral stone as her symptoms all began as fairly classic UTI symptoms a week  ago and only moved into her low back last night.  I do not think that CT scan is necessary at this time.  She describes a little bit of yellow vaginal discharge, but denies possibility of STDs as she is in a monogamous relationship with her fiancé.  Given the appearance of her urine and her symptoms, I have low concern for STDs at this time.  However, patient understands that urine culture is pending and if it comes back negative or if her vaginal discharge gets worse or she develops any worsening abdominal pain, she must return to the ER immediately for further evaluation.  I gave her a dose of Omnicef here in the ER.  I will send her home with 7 days of Omnicef.  She is to drink plenty of fluids.  At this time she is tolerating p.o. fluids.  She is not in any distress.  She has been given very strict return precautions and discharge instructions for UTI and she understands if she develops any fever over 100.4, worsening back pain, nausea, vomiting, shaking chills, myalgias, or any worsening in any way she must return to the ER immediately.  Patient understands treatment plan and follow-up.     The patient will return for new or worsening symptoms and is stable at the time of discharge.    The patient is referred to a primary physician for blood pressure management, diabetic screening, and for all other preventative health concerns.    I verified that the patient was wearing a mask and I was wearing appropriate PPE every time I entered the room. The patient's mask was on the patient at all times during my encounter.      DISPOSITION:  Patient will be discharged home in stable condition.    FOLLOW UP:  [address] [address] [phone]  Schedule an appointment as soon as possible for a visit in 2 days  If symptoms worsen return to ER      OUTPATIENT MEDICATIONS:  New Prescriptions    CEFDINIR (OMNICEF) 300 MG CAP    Take 1 Cap by mouth 2 times a day for 7 days.     PHENAZOPYRIDINE (PYRIDIUM) 200 MG TAB    Take 1 Tab by mouth 3 times a day as needed for up to 2 days.         FINAL IMPRESSION  1. Acute UTI Acute        This dictation has been created using voice recognition software. The accuracy of the dictation is limited by the abilities of the software. I expect there may be some errors of grammar and possibly content. I made every attempt to manually correct the errors within my dictation. However, errors related to voice recognition software may still exist and should be interpreted within the appropriate context.     Jose L LOVELL (Scribe), am scribing for, and in the presence of, Winsome Sumner M.D..    Electronically signed by: Jose L An ([place]), 5/31/2020    Winsome LOVELL M.D. personally performed the services described in this documentation, as scribed by Jose L An in my presence, and it is both accurate and complete. E.    The note accurately reflects work and decisions made by me.  Winsome Sumner M.D.  5/31/2020  10:14 PM

## 2020-06-01 NOTE — ED TRIAGE NOTES
Ambulatory to triage with   Chief Complaint   Patient presents with   • Painful Urination   • Flank Pain     R>L   Above complaints for over 1 week. States she had UTI symptoms, was not seen by a HCP, and now has progressed to bilateral flank pain, R>L. Afebrile. .

## 2020-06-01 NOTE — DISCHARGE INSTRUCTIONS
Return to the ER for any worsening pain with urination, increased blood in urine, worsening frequency and urgency of urination, increased cloudy or foul-smelling urine, worsening back pain, worsening abdominal pain, fevers over 100.4, shaking chills, nausea, vomiting, muscle aches/body aches, or for any concerns.    Drink plenty of fluids to stay well-hydrated.    Follow-up with the [facility] clinic within the next 1 to 2 days.  Please call tomorrow morning to schedule your appointment.

## 2020-06-01 NOTE — ED NOTES
Discharge instructions including the importance of hydration, the use of OTC medications, information on 1. Acute UTI   and the proper follow up recommendations have been provided. Verbalizes understanding.  Confirms all questions have been answered.  A copy of the discharge instructions have been provided.  A signed copy is in the chart.  All pertinent medications    Rosana Rick   Home Medication Instructions SID:[id number]    Printed on:05/31/20 9042   Medication Information                      cefdinir (OMNICEF) 300 MG Cap  Take 1 Cap by mouth 2 times a day for 7 days.             phenazopyridine (PYRIDIUM) 200 MG Tab  Take 1 Tab by mouth 3 times a day as needed for up to 2 days.              reviewed.   ambulated out of department; pt in NAD, awake, alert, interactive.

## 2020-06-02 NOTE — ED NOTES
ED Positive Culture Follow-up/Notification Note:    Date: 6/2/2020     Patient seen in the ED on 5/31/2020 for Painful urination with flank pain.   1. Acute UTI Acute     Patient was given cefdinir 300 mg x 1 in ED.    Discharge Medication List as of 5/31/2020  6:55 PM      START taking these medications    Details   cefdinir (OMNICEF) 300 MG Cap Take 1 Cap by mouth 2 times a day for 7 days., Disp-20 Cap,R-0, Print Rx Paper             Allergies: Nkda [no known drug allergy]     Vitals:    05/31/20 1701 05/31/20 1730 05/31/20 1836 05/31/20 1857   BP:  117/63 112/62 (!) 98/60   Pulse:  98 98 89   Resp:   20 20   Temp:   37 °C (98.6 °F) 36.6 °C (97.9 °F)   TempSrc:    Temporal   SpO2:  97% 98%    Weight: 61.6 kg (135 lb 12.9 oz)      Height:           Final cultures:   Results     Procedure Component Value Units Date/Time    URINE CULTURE(NEW) [[id number]]  (Abnormal)  (Susceptibility) Collected:  05/31/20 1722    Order Status:  Completed Specimen:  Urine Updated:  06/02/20 0915     Significant Indicator POS     Source UR     Site -     Culture Result Mixed skin kerry 10-50,000 cfu/mL      Escherichia coli  ,000 cfu/mL      Susceptibility     Escherichia coli (1)     Antibiotic Interpretation Microscan Method Status    Ampicillin Resistant >16 mcg/mL PERLA Final    Ceftriaxone Sensitive <=1 mcg/mL PERLA Final    Ceftazidime Sensitive <=1 mcg/mL PERLA Final    Cefotaxime Sensitive <=2 mcg/mL PERLA Final    Cefazolin Sensitive 4 mcg/mL PERLA Final    Ciprofloxacin Sensitive <=1 mcg/mL PERLA Final    Ampicillin/sulbactam Intermediate 16/8 mcg/mL PERLA Final    Cefepime Sensitive <=2 mcg/mL PERLA Final    Tobramycin Resistant >8 mcg/mL PERLA Final    Cefotetan Sensitive <=16 mcg/mL PERLA Final    Nitrofurantoin Sensitive <=32 mcg/mL PERLA Final    Gentamicin Resistant >8 mcg/mL PERLA Final    Levofloxacin Sensitive <=2 mcg/mL PERLA Final    Pip/Tazobactam Sensitive <=16 mcg/mL PERLA Final    Trimeth/Sulfa Sensitive <=2/38 mcg/mL PERLA Final                    URINE CULTURE (ADD ON) [[id number]]     Order Status:  Canceled Specimen:  Urine, Clean Catch     URINALYSIS,CULTURE IF INDICATED [[id number]]  (Abnormal) Collected:  05/31/20 1722    Order Status:  Completed Specimen:  Urine Updated:  05/31/20 1758     Color Yellow     Character Cloudy     Specific Gravity 1.020     Ph 6.5     Glucose Negative mg/dL      Ketones Negative mg/dL      Protein 30 mg/dL      Bilirubin Negative     Urobilinogen, Urine 0.2     Nitrite Negative     Leukocyte Esterase Large     Occult Blood Small     Micro Urine Req Microscopic          Plan:   Appropriate antibiotic therapy prescribed. No changes required based upon culture result.  Spoke with patient who states that she is feeling better with no chills, fevers, and relief of urinary pain. Notified patient of positive culture result and encourage compliance with prescribed antibiotics.     Gigi Gomez, PharmD

## 2021-07-21 ENCOUNTER — HOSPITAL ENCOUNTER (EMERGENCY)
Facility: MEDICAL CENTER | Age: 26
End: 2021-07-21
Attending: EMERGENCY MEDICINE
Payer: MEDICAID

## 2021-07-21 ENCOUNTER — APPOINTMENT (OUTPATIENT)
Dept: RADIOLOGY | Facility: MEDICAL CENTER | Age: 26
End: 2021-07-21
Attending: EMERGENCY MEDICINE
Payer: MEDICAID

## 2021-07-21 VITALS
DIASTOLIC BLOOD PRESSURE: 53 MMHG | SYSTOLIC BLOOD PRESSURE: 90 MMHG | BODY MASS INDEX: 26.49 KG/M2 | RESPIRATION RATE: 18 BRPM | OXYGEN SATURATION: 97 % | WEIGHT: 134.92 LBS | HEIGHT: 60 IN | HEART RATE: 95 BPM | TEMPERATURE: 98.8 F

## 2021-07-21 DIAGNOSIS — N12 PYELONEPHRITIS: ICD-10-CM

## 2021-07-21 DIAGNOSIS — Z34.92 SECOND TRIMESTER PREGNANCY: ICD-10-CM

## 2021-07-21 LAB
ALBUMIN SERPL BCP-MCNC: 3.4 G/DL (ref 3.2–4.9)
ALBUMIN/GLOB SERPL: 1 G/DL
ALP SERPL-CCNC: 81 U/L (ref 30–99)
ALT SERPL-CCNC: 7 U/L (ref 2–50)
ANION GAP SERPL CALC-SCNC: 12 MMOL/L (ref 7–16)
APPEARANCE UR: ABNORMAL
AST SERPL-CCNC: 12 U/L (ref 12–45)
B-HCG SERPL-ACNC: ABNORMAL MIU/ML (ref 0–5)
BACTERIA #/AREA URNS HPF: ABNORMAL /HPF
BASOPHILS # BLD AUTO: 0.1 % (ref 0–1.8)
BASOPHILS # BLD: 0.01 K/UL (ref 0–0.12)
BILIRUB SERPL-MCNC: 0.4 MG/DL (ref 0.1–1.5)
BILIRUB UR QL STRIP.AUTO: NEGATIVE
BUN SERPL-MCNC: 7 MG/DL (ref 8–22)
CALCIUM SERPL-MCNC: 8.7 MG/DL (ref 8.5–10.5)
CHLORIDE SERPL-SCNC: 99 MMOL/L (ref 96–112)
CO2 SERPL-SCNC: 22 MMOL/L (ref 20–33)
COLOR UR: YELLOW
CREAT SERPL-MCNC: 0.53 MG/DL (ref 0.5–1.4)
EOSINOPHIL # BLD AUTO: 0.17 K/UL (ref 0–0.51)
EOSINOPHIL NFR BLD: 1.5 % (ref 0–6.9)
EPI CELLS #/AREA URNS HPF: ABNORMAL /HPF
ERYTHROCYTE [DISTWIDTH] IN BLOOD BY AUTOMATED COUNT: 43.8 FL (ref 35.9–50)
GLOBULIN SER CALC-MCNC: 3.4 G/DL (ref 1.9–3.5)
GLUCOSE SERPL-MCNC: 169 MG/DL (ref 65–99)
GLUCOSE UR STRIP.AUTO-MCNC: 500 MG/DL
HCT VFR BLD AUTO: 36.5 % (ref 37–47)
HGB BLD-MCNC: 12.5 G/DL (ref 12–16)
HYALINE CASTS #/AREA URNS LPF: ABNORMAL /LPF
IMM GRANULOCYTES # BLD AUTO: 0.07 K/UL (ref 0–0.11)
IMM GRANULOCYTES NFR BLD AUTO: 0.6 % (ref 0–0.9)
KETONES UR STRIP.AUTO-MCNC: NEGATIVE MG/DL
LEUKOCYTE ESTERASE UR QL STRIP.AUTO: ABNORMAL
LIPASE SERPL-CCNC: 269 U/L (ref 11–82)
LYMPHOCYTES # BLD AUTO: 0.55 K/UL (ref 1–4.8)
LYMPHOCYTES NFR BLD: 4.9 % (ref 22–41)
MCH RBC QN AUTO: 31.6 PG (ref 27–33)
MCHC RBC AUTO-ENTMCNC: 34.2 G/DL (ref 33.6–35)
MCV RBC AUTO: 92.2 FL (ref 81.4–97.8)
MICRO URNS: ABNORMAL
MONOCYTES # BLD AUTO: 0.92 K/UL (ref 0–0.85)
MONOCYTES NFR BLD AUTO: 8.2 % (ref 0–13.4)
NEUTROPHILS # BLD AUTO: 9.44 K/UL (ref 2–7.15)
NEUTROPHILS NFR BLD: 84.7 % (ref 44–72)
NITRITE UR QL STRIP.AUTO: POSITIVE
NRBC # BLD AUTO: 0 K/UL
NRBC BLD-RTO: 0 /100 WBC
PH UR STRIP.AUTO: 6 [PH] (ref 5–8)
PLATELET # BLD AUTO: 243 K/UL (ref 164–446)
PMV BLD AUTO: 8.7 FL (ref 9–12.9)
POTASSIUM SERPL-SCNC: 3.5 MMOL/L (ref 3.6–5.5)
PROT SERPL-MCNC: 6.8 G/DL (ref 6–8.2)
PROT UR QL STRIP: NEGATIVE MG/DL
RBC # BLD AUTO: 3.96 M/UL (ref 4.2–5.4)
RBC # URNS HPF: ABNORMAL /HPF
RBC UR QL AUTO: ABNORMAL
SODIUM SERPL-SCNC: 133 MMOL/L (ref 135–145)
SP GR UR STRIP.AUTO: 1.02
UROBILINOGEN UR STRIP.AUTO-MCNC: 1 MG/DL
WBC # BLD AUTO: 11.2 K/UL (ref 4.8–10.8)
WBC #/AREA URNS HPF: ABNORMAL /HPF

## 2021-07-21 PROCEDURE — 83690 ASSAY OF LIPASE: CPT

## 2021-07-21 PROCEDURE — 85025 COMPLETE CBC W/AUTO DIFF WBC: CPT

## 2021-07-21 PROCEDURE — 80053 COMPREHEN METABOLIC PANEL: CPT

## 2021-07-21 PROCEDURE — 36415 COLL VENOUS BLD VENIPUNCTURE: CPT

## 2021-07-21 PROCEDURE — 96375 TX/PRO/DX INJ NEW DRUG ADDON: CPT

## 2021-07-21 PROCEDURE — 81001 URINALYSIS AUTO W/SCOPE: CPT

## 2021-07-21 PROCEDURE — 87186 SC STD MICRODIL/AGAR DIL: CPT

## 2021-07-21 PROCEDURE — 700105 HCHG RX REV CODE 258: Performed by: EMERGENCY MEDICINE

## 2021-07-21 PROCEDURE — 84702 CHORIONIC GONADOTROPIN TEST: CPT

## 2021-07-21 PROCEDURE — 700111 HCHG RX REV CODE 636 W/ 250 OVERRIDE (IP): Performed by: EMERGENCY MEDICINE

## 2021-07-21 PROCEDURE — 96374 THER/PROPH/DIAG INJ IV PUSH: CPT

## 2021-07-21 PROCEDURE — 76816 OB US FOLLOW-UP PER FETUS: CPT

## 2021-07-21 PROCEDURE — 87077 CULTURE AEROBIC IDENTIFY: CPT

## 2021-07-21 PROCEDURE — 87086 URINE CULTURE/COLONY COUNT: CPT

## 2021-07-21 PROCEDURE — 99284 EMERGENCY DEPT VISIT MOD MDM: CPT

## 2021-07-21 RX ORDER — CEPHALEXIN 500 MG/1
500 CAPSULE ORAL 4 TIMES DAILY
Qty: 40 CAPSULE | Refills: 0 | Status: SHIPPED | OUTPATIENT
Start: 2021-07-21 | End: 2021-07-31

## 2021-07-21 RX ORDER — SODIUM CHLORIDE 9 MG/ML
1000 INJECTION, SOLUTION INTRAVENOUS ONCE
Status: COMPLETED | OUTPATIENT
Start: 2021-07-21 | End: 2021-07-21

## 2021-07-21 RX ORDER — ONDANSETRON 2 MG/ML
4 INJECTION INTRAMUSCULAR; INTRAVENOUS ONCE
Status: COMPLETED | OUTPATIENT
Start: 2021-07-21 | End: 2021-07-21

## 2021-07-21 RX ADMIN — CEFTRIAXONE SODIUM 1 G: 10 INJECTION, POWDER, FOR SOLUTION INTRAVENOUS at 16:09

## 2021-07-21 RX ADMIN — SODIUM CHLORIDE 1000 ML: 9 INJECTION, SOLUTION INTRAVENOUS at 16:09

## 2021-07-21 RX ADMIN — ONDANSETRON 4 MG: 2 INJECTION INTRAMUSCULAR; INTRAVENOUS at 16:14

## 2021-07-21 NOTE — ED PROVIDER NOTES
ED Provider Note    CHIEF COMPLAINT  Chief Complaint   Patient presents with   • Flank Pain   • Painful Urination       HPI  Rosana Rick is a 25 y.o. female who presents for evaluation of left-sided flank pain with burning with urination over the past 3 days.  She is pregnant, G4, P3 at 14 weeks since her last menstrual period, has not sought any prenatal care as of yet.  No vaginal bleeding or discharge, no loss of fluid.  She does have a history of kidney infections.  No chest pain or shortness of breath.  No coughing, no rash.  Denies any ongoing medical problems.  States that her urine is cloudy and dark.    REVIEW OF SYSTEMS  Negative for fever, rash, chest pain, dyspnea, diarrhea, headache. All other systems are negative.     PAST MEDICAL HISTORY  History reviewed. No pertinent past medical history.    FAMILY HISTORY  History reviewed. No pertinent family history.    SOCIAL HISTORY  Social History     Tobacco Use   • Smoking status: Former Smoker   • Smokeless tobacco: Never Used   Vaping Use   • Vaping Use: Never used   Substance Use Topics   • Alcohol use: Not Currently   • Drug use: Not Currently     Comment: denies       SURGICAL HISTORY  History reviewed. No pertinent surgical history.    CURRENT MEDICATIONS  I personally reviewed the medication list in the charting documentation.     ALLERGIES  Allergies   Allergen Reactions   • Nkda [No Known Drug Allergy]        MEDICAL RECORD  I have reviewed patient's medical record and pertinent results are listed above.      PHYSICAL EXAM  VITAL SIGNS: BP (!) 94/55   Pulse (!) 115   Temp 36.4 °C (97.6 °F) (Temporal)   Resp 18   Ht 1.524 m (5')   Wt 61.2 kg (134 lb 14.7 oz)   LMP 04/09/2021 (Exact Date)   SpO2 95%   BMI 26.35 kg/m²    Constitutional: Well appearing patient in no acute distress.  Awake and alert, not toxic nor ill in appearance.  HENT: Normocephalic, no obvious evidence of acute trauma.  Eyes: No scleral icterus. Normal conjunctiva    Neck: Comfortable movement without any obvious restriction in the range of motion.  Cardiovascular: Upon ascultation I appreciate a regular heart rhythm and a tachycardic rate.  Thorax & Lungs: Normal nonlabored respirations.  Upon application of the stethoscope for auscultation I find there to be no associated chest wall tenderness.  I appreciate no wheezing, rhonchi or rales. There is normal air movement.    Abdomen: Minimal evidence of a gravid abdomen, no tenderness.  Skin: The exposed portions of skin reveal no obvious rash or other abnormalities.  Extremities/Musculoskeletal: No obvious sign of acute trauma. No asymmetric calf tenderness or edema.   Neurologic: Alert & oriented. No focal deficits observed.   Psychiatric: Normal affect appropriate for the clinical situation.    DIAGNOSTIC STUDIES / PROCEDURES    LABS/EKGs  Results for orders placed or performed during the hospital encounter of 07/21/21   CBC WITH DIFFERENTIAL   Result Value Ref Range    WBC 11.2 (H) 4.8 - 10.8 K/uL    RBC 3.96 (L) 4.20 - 5.40 M/uL    Hemoglobin 12.5 12.0 - 16.0 g/dL    Hematocrit 36.5 (L) 37.0 - 47.0 %    MCV 92.2 81.4 - 97.8 fL    MCH 31.6 27.0 - 33.0 pg    MCHC 34.2 33.6 - 35.0 g/dL    RDW 43.8 35.9 - 50.0 fL    Platelet Count 243 164 - 446 K/uL    MPV 8.7 (L) 9.0 - 12.9 fL    Neutrophils-Polys 84.70 (H) 44.00 - 72.00 %    Lymphocytes 4.90 (L) 22.00 - 41.00 %    Monocytes 8.20 0.00 - 13.40 %    Eosinophils 1.50 0.00 - 6.90 %    Basophils 0.10 0.00 - 1.80 %    Immature Granulocytes 0.60 0.00 - 0.90 %    Nucleated RBC 0.00 /100 WBC    Neutrophils (Absolute) 9.44 (H) 2.00 - 7.15 K/uL    Lymphs (Absolute) 0.55 (L) 1.00 - 4.80 K/uL    Monos (Absolute) 0.92 (H) 0.00 - 0.85 K/uL    Eos (Absolute) 0.17 0.00 - 0.51 K/uL    Baso (Absolute) 0.01 0.00 - 0.12 K/uL    Immature Granulocytes (abs) 0.07 0.00 - 0.11 K/uL    NRBC (Absolute) 0.00 K/uL   COMP METABOLIC PANEL   Result Value Ref Range    Sodium 133 (L) 135 - 145 mmol/L     Potassium 3.5 (L) 3.6 - 5.5 mmol/L    Chloride 99 96 - 112 mmol/L    Co2 22 20 - 33 mmol/L    Anion Gap 12.0 7.0 - 16.0    Glucose 169 (H) 65 - 99 mg/dL    Bun 7 (L) 8 - 22 mg/dL    Creatinine 0.53 0.50 - 1.40 mg/dL    Calcium 8.7 8.5 - 10.5 mg/dL    AST(SGOT) 12 12 - 45 U/L    ALT(SGPT) 7 2 - 50 U/L    Alkaline Phosphatase 81 30 - 99 U/L    Total Bilirubin 0.4 0.1 - 1.5 mg/dL    Albumin 3.4 3.2 - 4.9 g/dL    Total Protein 6.8 6.0 - 8.2 g/dL    Globulin 3.4 1.9 - 3.5 g/dL    A-G Ratio 1.0 g/dL   LIPASE   Result Value Ref Range    Lipase 269 (H) 11 - 82 U/L   HCG QUANTITATIVE   Result Value Ref Range    Bhcg 26457.0 (H) 0.0 - 5.0 mIU/mL   URINALYSIS,CULTURE IF INDICATED    Specimen: Blood   Result Value Ref Range    Color Yellow     Character Cloudy (A)     Specific Gravity 1.021 <1.035    Ph 6.0 5.0 - 8.0    Glucose 500 (A) Negative mg/dL    Ketones Negative Negative mg/dL    Protein Negative Negative mg/dL    Bilirubin Negative Negative    Urobilinogen, Urine 1.0 Negative    Nitrite Positive (A) Negative    Leukocyte Esterase Moderate (A) Negative    Occult Blood Trace (A) Negative    Micro Urine Req Microscopic    URINE MICROSCOPIC (W/UA)   Result Value Ref Range    WBC Packed (A) /hpf    RBC 2-5 (A) /hpf    Bacteria Many (A) None /hpf    Epithelial Cells Moderate (A) /hpf    Hyaline Cast 0-2 /lpf   ESTIMATED GFR   Result Value Ref Range    GFR If African American >60 >60 mL/min/1.73 m 2    GFR If Non African American >60 >60 mL/min/1.73 m 2        RADIOLOGY  US-OB LIMITED GROWTH FOLLOW UP Is the patient pregnant? Yes   Final Result      Single intrauterine pregnancy of an estimated gestational age of 17 weeks 1 day with an estimated date of delivery of 12/28/2021.      Complete fetal survey was not performed.            COURSE & MEDICAL DECISION MAKING  I have reviewed any medical record information, laboratory studies and radiographic results as noted above.  Differential diagnoses includes: Pyelonephritis,  dehydration, electrolyte abnormalities, anemia, sepsis, dehydration    Encounter Summary: This is a very pleasant 25 y.o. female who unfortunately required evaluation in the emergency department today with left-sided flank pain and dysuria in the setting of early second trimester pregnancy, afebrile, benign abdomen on exam.  She is tachycardic.  Her urinalysis is clearly consistent with infection, the patient has pyelonephritis in the setting of pregnancy.  She has not had any prenatal care as of yet.  Abdominal pain/pregnancy triage work-up has been initiated, she will receive some ceftriaxone IV fluids given her tachycardia, I will discussed the case with the on-call physician ------- her WBC is less than 12,000.  Elevation in her lipase but she does not have upper abdominal pain or associated tenderness.  I discussed the case with Dr. Pizano, the on-call obstetrician, since the patient is so well-appearing, has an unremarkable WBC, is not vomiting, she will be appropriate for outpatient management.  However her lack of obstetrical care so far is concerning as the patient will likely require suppressive doses of antibiotics for the rest of her pregnancy.  I have stressed the importance of following up with the obstetrician and the patient does express understanding.  She will be treated with a 10-day course of Keflex.  Additionally, her glucose is noted to be somewhat high with some spilling in the urine, she will follow-up regarding that as well.  I have gone over strict return instructions, the patient is being discharged home in stable condition.       DISPOSITION: Discharged home in stable condition      FINAL IMPRESSION  1. Pyelonephritis    2. Second trimester pregnancy           This dictation was created using voice recognition software. The accuracy of the dictation is limited to the abilities of the software. I expect there may be some errors of grammar and possibly content. The nursing notes were  reviewed and certain aspects of this information were incorporated into this note.    Electronically signed by: Cedric Calloway M.D., 7/21/2021 3:53 PM

## 2021-07-21 NOTE — ED TRIAGE NOTES
Ambulatory to triage with   Chief Complaint   Patient presents with   • Flank Pain   • Painful Urination   Left flank pain and dysuria x 3 days. Denies fever or chills. [facility] 4/9/21. States she is pregnant. Has not had any prenatal care. ; BP 94/55.     Protocol ordered.

## 2021-07-21 NOTE — LETTER
7/26/2021               [facility] Rick  [address] [facility] [address]        Dear Rosana (MR#[id number])    This letter is sent in regards to your recent visit to the [facility] Emergency Department on 7/21/2021. During the visit, tests were performed to assist the physician in your medical diagnosis. A review of your tests requires that we notify you of the following:    Your urine culture was POSITIVE for a bacteria called Klebsiella pneumoniae. The antibiotic prescribed for you (cephalexin) should be active to treat this bacteria. It is important that you continue taking your antibiotic until it is finished. If your symptoms are not improving or you are feeling worse, please contact me at the number below.    Please feel free to contact me at the number below if you have any questions or concerns. Thank you for your cooperation in the matter.    Sincerely,  ED Culture Follow-Up Staff  Emilee Bautista, PharmD    [facility], Emergency Department  [address] [place] [id number] [phone] (Emilee's Phone Number)  [phone] (ED Culture Line)

## 2021-07-21 NOTE — ED NOTES
Chief Complaint   Patient presents with   • Flank Pain   • Painful Urination     Agree with triage RN. Pt medicated per MAR.

## 2021-07-22 NOTE — ED NOTES
All discharge instructions given to pt .  Pt advised not to drink or drive.  Pt verbalized understanding of all discharge instructions. All lines removed prior to discharge.  All questions answered.

## 2021-07-27 NOTE — ED NOTES
ED Positive Culture Follow-up/Notification Note:    Date: 7/26/2021     Patient seen in the ED on 7/21/2021 for flank pain + painful urination. Patient is pregnant at 14 weeks.   1. Pyelonephritis    2. Second trimester pregnancy       Patient received Ceftriaxone 1 g IV x1 in the ED.     Discharge Medication List as of 7/21/2021  5:35 PM      START taking these medications    Details   cephALEXin (KEFLEX) 500 MG Cap Take 1 capsule by mouth 4 times a day for 10 days., Disp-40 capsule, R-0, Normal             Allergies: Nkda [no known drug allergy]     Vitals:    07/21/21 1449 07/21/21 1617 07/21/21 1738 07/21/21 1748   BP:  (!) 93/55  (!) 90/53   Pulse:  (!) 102 95    Resp:       Temp:    37.1 °C (98.8 °F)   TempSrc:    Temporal   SpO2:  97% 97%    Weight: 61.2 kg (134 lb 14.7 oz)      Height:           Final cultures:   Results     Procedure Component Value Units Date/Time    URINE CULTURE(NEW) [[id number]]  (Abnormal)  (Susceptibility) Collected: 07/21/21 1504    Order Status: Completed Specimen: Urine Updated: 07/23/21 1350     Significant Indicator POS     Source UR     Site -     Culture Result -      Klebsiella pneumoniae  >100,000 cfu/mL      Narrative:      Indication for culture:->Pregnant women: fever and/or  asymptomatic screening    Susceptibility     Klebsiella pneumoniae (1)     Antibiotic Interpretation Microscan Method Status    Amikacin  [*]  Sensitive <=16 mcg/mL PERLA Final    Ampicillin Resistant >16 mcg/mL PERLA Final    Amoxicillin/CA  [*]  Sensitive <=8/4 mcg/mL PERLA Final    Aztreonam  [*]  Sensitive <=4 mcg/mL PERLA Final    Ceftriaxone Sensitive <=1 mcg/mL PERLA Final    Ceftazidime  [*]  Sensitive <=1 mcg/mL PERLA Final    Cefazolin Sensitive <=2 mcg/mL PERLA Final    Ciprofloxacin Sensitive <=0.25 mcg/mL PERLA Final    Cefepime Sensitive <=2 mcg/mL PERLA Final    Ampicillin/sulbactam Sensitive <=4/2 mcg/mL PERLA Final    Cefuroxime Sensitive <=4 mcg/mL PERLA Final    Tobramycin Sensitive <=2 mcg/mL PERLA Final     Ertapenem  [*]  Sensitive <=0.5 mcg/mL PERLA Final    Nitrofurantoin Sensitive <=32 mcg/mL PERLA Final    Gentamicin Sensitive <=2 mcg/mL PERLA Final    Levofloxacin Sensitive <=0.5 mcg/mL PERLA Final    Meropenem  [*]  Sensitive <=1 mcg/mL PERLA Final    Pip/Tazobactam Sensitive <=8 mcg/mL PERLA Final    Trimeth/Sulfa Sensitive <=0.5/9.5 mcg/mL PERLA Final    Tetracycline  [*]  Sensitive <=4 mcg/mL PERLA Final    Tigecycline Sensitive <=2 mcg/mL PERLA Final           [*]  Suppressed Antibiotic                 URINALYSIS,CULTURE IF INDICATED [[id number]]  (Abnormal) Collected: 07/21/21 1504    Order Status: Completed Specimen: Blood Updated: 07/21/21 1532     Color Yellow     Character Cloudy     Specific Gravity 1.021     Ph 6.0     Glucose 500 mg/dL      Ketones Negative mg/dL      Protein Negative mg/dL      Bilirubin Negative     Urobilinogen, Urine 1.0     Nitrite Positive     Leukocyte Esterase Moderate     Occult Blood Trace     Micro Urine Req Microscopic    Narrative:      Indication for culture:->Pregnant women: fever and/or  asymptomatic screening          Plan:   Appropriate antibiotic therapy prescribed. No changes required based upon culture result. Called patient to discuss current clinical status but no answer, left voicemail for patient to call back. If patient worsening despite antibiotic therapy, would recommend patient come in for re-evaluation or change antibiotic to Augmentin 875-125 mg BID x 10 days. Sent letter to patient to notify of positive culture result and encourage compliance with prescribed antibiotics and to call if she has worsening symptoms.    Emilee Bautista, PharmD  PGY2 Infectious Diseases Pharmacy Resident

## 2021-08-30 ENCOUNTER — TELEPHONE (OUTPATIENT)
Dept: OBGYN | Facility: CLINIC | Age: 26
End: 2021-08-30

## 2021-08-30 NOTE — TELEPHONE ENCOUNTER
**Pt left message asking for a call back./ Called pt but went straight to . M to call us back** OV

## 2021-09-13 ENCOUNTER — APPOINTMENT (OUTPATIENT)
Dept: OBGYN | Facility: CLINIC | Age: 26
End: 2021-09-13
Payer: MEDICAID

## 2021-09-13 ENCOUNTER — HOSPITAL ENCOUNTER (OUTPATIENT)
Facility: MEDICAL CENTER | Age: 26
End: 2021-09-13
Attending: NURSE PRACTITIONER
Payer: MEDICAID

## 2021-09-13 ENCOUNTER — INITIAL PRENATAL (OUTPATIENT)
Dept: OBGYN | Facility: CLINIC | Age: 26
End: 2021-09-13
Payer: MEDICAID

## 2021-09-13 VITALS
WEIGHT: 139.9 LBS | DIASTOLIC BLOOD PRESSURE: 60 MMHG | HEIGHT: 60 IN | BODY MASS INDEX: 27.47 KG/M2 | SYSTOLIC BLOOD PRESSURE: 110 MMHG

## 2021-09-13 DIAGNOSIS — Z34.82 ENCOUNTER FOR SUPERVISION OF OTHER NORMAL PREGNANCY, SECOND TRIMESTER: Primary | ICD-10-CM

## 2021-09-13 PROBLEM — R52 PAIN RELATED TO VAGINAL DELIVERY: Status: RESOLVED | Noted: 2018-07-14 | Resolved: 2021-09-13

## 2021-09-13 PROCEDURE — 87591 N.GONORRHOEAE DNA AMP PROB: CPT

## 2021-09-13 PROCEDURE — 0500F INITIAL PRENATAL CARE VISIT: CPT | Performed by: NURSE PRACTITIONER

## 2021-09-13 PROCEDURE — 87491 CHLMYD TRACH DNA AMP PROBE: CPT

## 2021-09-13 PROCEDURE — 87624 HPV HI-RISK TYP POOLED RSLT: CPT

## 2021-09-13 PROCEDURE — 88175 CYTOPATH C/V AUTO FLUID REDO: CPT

## 2021-09-13 PROCEDURE — 81002 URINALYSIS NONAUTO W/O SCOPE: CPT | Performed by: NURSE PRACTITIONER

## 2021-09-13 RX ORDER — NITROFURANTOIN 25; 75 MG/1; MG/1
100 CAPSULE ORAL DAILY
Qty: 90 CAPSULE | Refills: 1 | Status: ON HOLD | OUTPATIENT
Start: 2021-09-13 | End: 2021-12-18

## 2021-09-13 ASSESSMENT — ENCOUNTER SYMPTOMS
MUSCULOSKELETAL NEGATIVE: 1
NEUROLOGICAL NEGATIVE: 1
EYES NEGATIVE: 1
CONSTITUTIONAL NEGATIVE: 1
GASTROINTESTINAL NEGATIVE: 1
CARDIOVASCULAR NEGATIVE: 1
PSYCHIATRIC NEGATIVE: 1
RESPIRATORY NEGATIVE: 1

## 2021-09-13 ASSESSMENT — FIBROSIS 4 INDEX: FIB4 SCORE: .5039526306789696362

## 2021-09-13 NOTE — PROGRESS NOTES
NOB today  LMP: 04/09/2021  Last pap: Today  Phone # [phone]  Pharmacy confirmed  On PNV Yes  Cystic Fibrosis test offered. Declined   No complaints as of today

## 2021-09-13 NOTE — PROGRESS NOTES
S:  Rosana Rick is a 25 y.o.  female  @ She is 24w6d with an ARIES of Estimated Date of Delivery: 21 based off of US who presents for her new OB exam.      Her OB hx includes  x 3 at term.   History of HSV I or II in self or partner: no  History of STIs in self or partner: no  History of Thyroid problems: no    ONe ER visits in this pregnancy for LT flank pain. Placed on ABX, hx of pyelonephritis.. She has no complaints.  Too late for AFP.  Declines CF.  Reports positive FM, VB, LOF, or cramping.  Denies dysuria, vaginal DC.  Pt is  and lives with family. FOB is involved and supportive. She is currently working as homemaker, no heavy lifting, no chemical exposure.  Pregnancy is unplanned but welcomed.    O:    Vitals:    21 1355   BP: 110/60   Weight: 63.5 kg (139 lb 14.4 oz)   Height: 1.524 m (5')    See H&P Prenatal Physical.  Wet mount: not indicated        FHTs: 135        Fundal ht: 24cm     A:   1.  IUP @ 24w6d ARIES: Estimated Date of Delivery: 21 per US         2.  S=D        3.    Patient Active Problem List    Diagnosis Date Noted   • Encounter for supervision of other normal pregnancy, second trimester 2021         P:  1.  GC/CT today. Pap today.         2.  Prenatal labs, 1 hr GTT and UDS ordered - lab slip given        3.  Discussed diet and exercise during pregnancy. Encouraged good nutrition, and daily exercise including walking or swimming. Discussed expected weight gain during pregnancy.              4.  Discussed adequate hydration during pregnancy.        5.  NOB packet given        6.  Return to office in 4 wks        7.  Complete OB US at next availability         8.  Pregnancy guide provided        9.   Not a candidate for Centering Pregnancy       10.  Rx for Macrobid prophylaxis        11. Discussed recommendations regarding covid vaccine safety and efficacy during pregnancy as outlined by ACOG.      HPI    Review of Systems    Constitutional: Negative.    HENT: Negative.    Eyes: Negative.    Respiratory: Negative.    Cardiovascular: Negative.    Gastrointestinal: Negative.    Genitourinary: Negative.    Musculoskeletal: Negative.    Skin: Negative.    Neurological: Negative.    Endo/Heme/Allergies: Negative.    Psychiatric/Behavioral: Negative.               Objective     /60   Ht 1.524 m (5')   Wt 63.5 kg (139 lb 14.4 oz)   LMP 04/09/2021 (Exact Date)   BMI 27.32 kg/m²      Physical Exam  Vitals and nursing note reviewed.   Constitutional:       Appearance: She is well-developed.   Cardiovascular:      Rate and Rhythm: Normal rate and regular rhythm.      Heart sounds: Normal heart sounds.   Pulmonary:      Effort: Pulmonary effort is normal.      Breath sounds: Normal breath sounds.   Abdominal:      Palpations: Abdomen is soft.   Genitourinary:     Vagina: Normal.      Comments: Uterus enlarged, c/w 24 wk ga  Musculoskeletal:         General: Normal range of motion.      Cervical back: Normal range of motion and neck supple.   Skin:     General: Skin is warm and dry.   Neurological:      Mental Status: She is alert and oriented to person, place, and time.      Deep Tendon Reflexes: Reflexes are normal and symmetric.   Psychiatric:         Behavior: Behavior normal.         Thought Content: Thought content normal.         Judgment: Judgment normal.

## 2021-09-14 DIAGNOSIS — Z34.82 ENCOUNTER FOR SUPERVISION OF OTHER NORMAL PREGNANCY, SECOND TRIMESTER: ICD-10-CM

## 2021-09-14 LAB
APPEARANCE UR: CLEAR
BILIRUB UR STRIP-MCNC: NORMAL MG/DL
C TRACH DNA GENITAL QL NAA+PROBE: NEGATIVE
COLOR UR AUTO: YELLOW
CYTOLOGY REG CYTOL: ABNORMAL
GLUCOSE UR STRIP.AUTO-MCNC: 250 MG/DL
HPV HR 12 DNA CVX QL NAA+PROBE: POSITIVE
HPV16 DNA SPEC QL NAA+PROBE: NEGATIVE
HPV18 DNA SPEC QL NAA+PROBE: NEGATIVE
KETONES UR STRIP.AUTO-MCNC: NEGATIVE MG/DL
LEUKOCYTE ESTERASE UR QL STRIP.AUTO: NORMAL
N GONORRHOEA DNA GENITAL QL NAA+PROBE: NEGATIVE
NITRITE UR QL STRIP.AUTO: NEGATIVE
PH UR STRIP.AUTO: 7 [PH] (ref 5–8)
PROT UR QL STRIP: NEGATIVE MG/DL
RBC UR QL AUTO: NEGATIVE
SP GR UR STRIP.AUTO: 1.02
SPECIMEN SOURCE: ABNORMAL
SPECIMEN SOURCE: ABNORMAL
UROBILINOGEN UR STRIP-MCNC: NORMAL MG/DL

## 2021-09-23 DIAGNOSIS — R87.810 ASCUS WITH POSITIVE HIGH RISK HPV CERVICAL: ICD-10-CM

## 2021-09-23 DIAGNOSIS — R87.610 ASCUS WITH POSITIVE HIGH RISK HPV CERVICAL: ICD-10-CM

## 2021-12-16 ENCOUNTER — HOSPITAL ENCOUNTER (INPATIENT)
Facility: MEDICAL CENTER | Age: 26
LOS: 2 days | End: 2021-12-18
Attending: OBSTETRICS & GYNECOLOGY | Admitting: OBSTETRICS & GYNECOLOGY
Payer: MEDICAID

## 2021-12-16 DIAGNOSIS — D64.9 ANEMIA, UNSPECIFIED TYPE: ICD-10-CM

## 2021-12-16 LAB
ABO GROUP BLD: NORMAL
AMPHET UR QL SCN: POSITIVE
BARBITURATES UR QL SCN: NEGATIVE
BASOPHILS # BLD AUTO: 0.3 % (ref 0–1.8)
BASOPHILS # BLD: 0.02 K/UL (ref 0–0.12)
BENZODIAZ UR QL SCN: NEGATIVE
BLD GP AB SCN SERPL QL: NORMAL
BZE UR QL SCN: NEGATIVE
CANNABINOIDS UR QL SCN: NEGATIVE
EOSINOPHIL # BLD AUTO: 0.12 K/UL (ref 0–0.51)
EOSINOPHIL NFR BLD: 1.6 % (ref 0–6.9)
ERYTHROCYTE [DISTWIDTH] IN BLOOD BY AUTOMATED COUNT: 42.3 FL (ref 35.9–50)
ERYTHROCYTE [DISTWIDTH] IN BLOOD BY AUTOMATED COUNT: 42.3 FL (ref 35.9–50)
EST. AVERAGE GLUCOSE BLD GHB EST-MCNC: 111 MG/DL
HBA1C MFR BLD: 5.5 % (ref 4–5.6)
HBV SURFACE AG SER QL: NORMAL
HCT VFR BLD AUTO: 23.7 % (ref 37–47)
HCT VFR BLD AUTO: 37.3 % (ref 37–47)
HCV AB SER QL: NORMAL
HGB BLD-MCNC: 12.5 G/DL (ref 12–16)
HGB BLD-MCNC: 8 G/DL (ref 12–16)
HIV 1+2 AB+HIV1 P24 AG SERPL QL IA: NORMAL
IMM GRANULOCYTES # BLD AUTO: 0.04 K/UL (ref 0–0.11)
IMM GRANULOCYTES NFR BLD AUTO: 0.5 % (ref 0–0.9)
LYMPHOCYTES # BLD AUTO: 1.77 K/UL (ref 1–4.8)
LYMPHOCYTES NFR BLD: 23.4 % (ref 22–41)
MCH RBC QN AUTO: 27.8 PG (ref 27–33)
MCH RBC QN AUTO: 28.3 PG (ref 27–33)
MCHC RBC AUTO-ENTMCNC: 33.5 G/DL (ref 33.6–35)
MCHC RBC AUTO-ENTMCNC: 33.8 G/DL (ref 33.6–35)
MCV RBC AUTO: 83.1 FL (ref 81.4–97.8)
MCV RBC AUTO: 83.7 FL (ref 81.4–97.8)
METHADONE UR QL SCN: NEGATIVE
MONOCYTES # BLD AUTO: 0.57 K/UL (ref 0–0.85)
MONOCYTES NFR BLD AUTO: 7.5 % (ref 0–13.4)
NEUTROPHILS # BLD AUTO: 5.05 K/UL (ref 2–7.15)
NEUTROPHILS NFR BLD: 66.7 % (ref 44–72)
NRBC # BLD AUTO: 0 K/UL
NRBC BLD-RTO: 0 /100 WBC
OPIATES UR QL SCN: NEGATIVE
OXYCODONE UR QL SCN: NEGATIVE
PCP UR QL SCN: NEGATIVE
PLATELET # BLD AUTO: 182 K/UL (ref 164–446)
PLATELET # BLD AUTO: 194 K/UL (ref 164–446)
PMV BLD AUTO: 10.2 FL (ref 9–12.9)
PMV BLD AUTO: 10.9 FL (ref 9–12.9)
PROPOXYPH UR QL SCN: NEGATIVE
RBC # BLD AUTO: 2.83 M/UL (ref 4.2–5.4)
RBC # BLD AUTO: 4.49 M/UL (ref 4.2–5.4)
RH BLD: NORMAL
RUBV AB SER QL: 53.3 IU/ML
SARS-COV+SARS-COV-2 AG RESP QL IA.RAPID: NOTDETECTED
SPECIMEN SOURCE: NORMAL
TREPONEMA PALLIDUM IGG+IGM AB [PRESENCE] IN SERUM OR PLASMA BY IMMUNOASSAY: NORMAL
WBC # BLD AUTO: 11.3 K/UL (ref 4.8–10.8)
WBC # BLD AUTO: 7.6 K/UL (ref 4.8–10.8)

## 2021-12-16 PROCEDURE — 87086 URINE CULTURE/COLONY COUNT: CPT

## 2021-12-16 PROCEDURE — A9270 NON-COVERED ITEM OR SERVICE: HCPCS | Performed by: OBSTETRICS & GYNECOLOGY

## 2021-12-16 PROCEDURE — 87186 SC STD MICRODIL/AGAR DIL: CPT

## 2021-12-16 PROCEDURE — A9270 NON-COVERED ITEM OR SERVICE: HCPCS | Performed by: NURSE PRACTITIONER

## 2021-12-16 PROCEDURE — 302449 STATCHG TRIAGE ONLY (STATISTIC)

## 2021-12-16 PROCEDURE — 87389 HIV-1 AG W/HIV-1&-2 AB AG IA: CPT

## 2021-12-16 PROCEDURE — 85027 COMPLETE CBC AUTOMATED: CPT

## 2021-12-16 PROCEDURE — 86803 HEPATITIS C AB TEST: CPT

## 2021-12-16 PROCEDURE — 36415 COLL VENOUS BLD VENIPUNCTURE: CPT

## 2021-12-16 PROCEDURE — 86780 TREPONEMA PALLIDUM: CPT

## 2021-12-16 PROCEDURE — 80307 DRUG TEST PRSMV CHEM ANLYZR: CPT

## 2021-12-16 PROCEDURE — 770002 HCHG ROOM/CARE - OB PRIVATE (112)

## 2021-12-16 PROCEDURE — 85025 COMPLETE CBC W/AUTO DIFF WBC: CPT

## 2021-12-16 PROCEDURE — 83036 HEMOGLOBIN GLYCOSYLATED A1C: CPT

## 2021-12-16 PROCEDURE — 87340 HEPATITIS B SURFACE AG IA: CPT

## 2021-12-16 PROCEDURE — 86762 RUBELLA ANTIBODY: CPT

## 2021-12-16 PROCEDURE — 86850 RBC ANTIBODY SCREEN: CPT

## 2021-12-16 PROCEDURE — 96374 THER/PROPH/DIAG INJ IV PUSH: CPT

## 2021-12-16 PROCEDURE — 86900 BLOOD TYPING SEROLOGIC ABO: CPT

## 2021-12-16 PROCEDURE — 700111 HCHG RX REV CODE 636 W/ 250 OVERRIDE (IP): Performed by: NURSE PRACTITIONER

## 2021-12-16 PROCEDURE — 700111 HCHG RX REV CODE 636 W/ 250 OVERRIDE (IP)

## 2021-12-16 PROCEDURE — 304965 HCHG RECOVERY SERVICES

## 2021-12-16 PROCEDURE — 700105 HCHG RX REV CODE 258: Performed by: NURSE PRACTITIONER

## 2021-12-16 PROCEDURE — 59409 OBSTETRICAL CARE: CPT

## 2021-12-16 PROCEDURE — 86901 BLOOD TYPING SEROLOGIC RH(D): CPT

## 2021-12-16 PROCEDURE — 700102 HCHG RX REV CODE 250 W/ 637 OVERRIDE(OP): Performed by: NURSE PRACTITIONER

## 2021-12-16 PROCEDURE — 700102 HCHG RX REV CODE 250 W/ 637 OVERRIDE(OP): Performed by: OBSTETRICS & GYNECOLOGY

## 2021-12-16 PROCEDURE — 87426 SARSCOV CORONAVIRUS AG IA: CPT

## 2021-12-16 RX ORDER — ACETAMINOPHEN 325 MG/1
650 TABLET ORAL EVERY 4 HOURS PRN
Status: DISCONTINUED | OUTPATIENT
Start: 2021-12-16 | End: 2021-12-16

## 2021-12-16 RX ORDER — MISOPROSTOL 200 UG/1
600 TABLET ORAL
Status: DISCONTINUED | OUTPATIENT
Start: 2021-12-16 | End: 2021-12-18 | Stop reason: HOSPADM

## 2021-12-16 RX ORDER — ACETAMINOPHEN 325 MG/1
325 TABLET ORAL EVERY 4 HOURS PRN
Status: DISCONTINUED | OUTPATIENT
Start: 2021-12-16 | End: 2021-12-18 | Stop reason: HOSPADM

## 2021-12-16 RX ORDER — IBUPROFEN 800 MG/1
800 TABLET ORAL EVERY 8 HOURS PRN
Status: DISCONTINUED | OUTPATIENT
Start: 2021-12-16 | End: 2021-12-18 | Stop reason: HOSPADM

## 2021-12-16 RX ORDER — ACETAMINOPHEN 325 MG/1
650 TABLET ORAL EVERY 4 HOURS PRN
Status: DISCONTINUED | OUTPATIENT
Start: 2021-12-16 | End: 2021-12-18 | Stop reason: HOSPADM

## 2021-12-16 RX ORDER — OXYCODONE HYDROCHLORIDE AND ACETAMINOPHEN 5; 325 MG/1; MG/1
1 TABLET ORAL EVERY 4 HOURS PRN
Status: DISCONTINUED | OUTPATIENT
Start: 2021-12-16 | End: 2021-12-18 | Stop reason: HOSPADM

## 2021-12-16 RX ORDER — SODIUM CHLORIDE, SODIUM LACTATE, POTASSIUM CHLORIDE, CALCIUM CHLORIDE 600; 310; 30; 20 MG/100ML; MG/100ML; MG/100ML; MG/100ML
INJECTION, SOLUTION INTRAVENOUS CONTINUOUS
Status: DISCONTINUED | OUTPATIENT
Start: 2021-12-16 | End: 2021-12-16

## 2021-12-16 RX ORDER — IBUPROFEN 600 MG/1
600 TABLET ORAL EVERY 6 HOURS PRN
Status: DISCONTINUED | OUTPATIENT
Start: 2021-12-16 | End: 2021-12-16

## 2021-12-16 RX ORDER — OXYTOCIN 10 [USP'U]/ML
10 INJECTION, SOLUTION INTRAMUSCULAR; INTRAVENOUS
Status: DISCONTINUED | OUTPATIENT
Start: 2021-12-16 | End: 2021-12-16 | Stop reason: HOSPADM

## 2021-12-16 RX ORDER — ROPIVACAINE HYDROCHLORIDE 2 MG/ML
INJECTION, SOLUTION EPIDURAL; INFILTRATION; PERINEURAL
Status: DISCONTINUED
Start: 2021-12-16 | End: 2021-12-16

## 2021-12-16 RX ORDER — SODIUM CHLORIDE, SODIUM LACTATE, POTASSIUM CHLORIDE, CALCIUM CHLORIDE 600; 310; 30; 20 MG/100ML; MG/100ML; MG/100ML; MG/100ML
INJECTION, SOLUTION INTRAVENOUS PRN
Status: DISCONTINUED | OUTPATIENT
Start: 2021-12-16 | End: 2021-12-18 | Stop reason: HOSPADM

## 2021-12-16 RX ORDER — MISOPROSTOL 200 UG/1
800 TABLET ORAL
Status: DISCONTINUED | OUTPATIENT
Start: 2021-12-16 | End: 2021-12-16 | Stop reason: HOSPADM

## 2021-12-16 RX ORDER — HYDROCODONE BITARTRATE AND ACETAMINOPHEN 5; 325 MG/1; MG/1
2 TABLET ORAL EVERY 4 HOURS PRN
Status: DISCONTINUED | OUTPATIENT
Start: 2021-12-16 | End: 2021-12-18 | Stop reason: HOSPADM

## 2021-12-16 RX ORDER — METHYLERGONOVINE MALEATE 0.2 MG/ML
0.2 INJECTION INTRAVENOUS
Status: DISCONTINUED | OUTPATIENT
Start: 2021-12-16 | End: 2021-12-18 | Stop reason: HOSPADM

## 2021-12-16 RX ORDER — DOCUSATE SODIUM 100 MG/1
100 CAPSULE, LIQUID FILLED ORAL 2 TIMES DAILY PRN
Status: DISCONTINUED | OUTPATIENT
Start: 2021-12-16 | End: 2021-12-18 | Stop reason: HOSPADM

## 2021-12-16 RX ORDER — BUPIVACAINE HYDROCHLORIDE 2.5 MG/ML
INJECTION, SOLUTION EPIDURAL; INFILTRATION; INTRACAUDAL
Status: ACTIVE
Start: 2021-12-16 | End: 2021-12-16

## 2021-12-16 RX ADMIN — HYDROCODONE BITARTRATE AND ACETAMINOPHEN 2 TABLET: 5; 325 TABLET ORAL at 10:43

## 2021-12-16 RX ADMIN — FENTANYL CITRATE 100 MCG: 50 INJECTION INTRAMUSCULAR; INTRAVENOUS at 08:57

## 2021-12-16 RX ADMIN — SODIUM CHLORIDE, POTASSIUM CHLORIDE, SODIUM LACTATE AND CALCIUM CHLORIDE: 600; 310; 30; 20 INJECTION, SOLUTION INTRAVENOUS at 07:30

## 2021-12-16 RX ADMIN — DOCUSATE SODIUM 100 MG: 100 CAPSULE, LIQUID FILLED ORAL at 22:07

## 2021-12-16 RX ADMIN — OXYTOCIN 250 ML/HR: 10 INJECTION, SOLUTION INTRAMUSCULAR; INTRAVENOUS at 12:17

## 2021-12-16 RX ADMIN — IBUPROFEN 800 MG: 800 TABLET, FILM COATED ORAL at 10:40

## 2021-12-16 RX ADMIN — IBUPROFEN 800 MG: 800 TABLET, FILM COATED ORAL at 22:07

## 2021-12-16 RX ADMIN — OXYTOCIN 2000 ML/HR: 10 INJECTION, SOLUTION INTRAMUSCULAR; INTRAVENOUS at 09:55

## 2021-12-16 ASSESSMENT — PAIN DESCRIPTION - PAIN TYPE
TYPE: ACUTE PAIN

## 2021-12-16 ASSESSMENT — FIBROSIS 4 INDEX: FIB4 SCORE: 0.52

## 2021-12-16 ASSESSMENT — LIFESTYLE VARIABLES: EVER_SMOKED: NEVER

## 2021-12-16 NOTE — L&D DELIVERY NOTE
DATE OF SERVICE:  2021     This is a 26-year-old  4, para 3 female at 38 weeks' gestation.  She   came in with ruptured membranes in spontaneous active labor.  She is a patient   of the pregnancy center.  I was asked to step in as her doctor was occupied   taking care of another patient. When I came into the room, the patient was   starting to push in voluntarily.  Her cervix was an anterior lip that was   reduced and she pushed with one contraction to go ahead and deliver a male   infant from OA presentation.  Delivery of the head was manually assisted.    Shoulders and the rest of body followed without difficulty.  Baby was placed   directly on mom's abdomen with a vigorous cry and excellent tone.  Cord was   doubly clamped and cut at approximately 1 minute post-delivery.  Placenta then   delivered spontaneously and intact with some patient pushing, inspection of   the vagina and perineum revealed it to be intact.  Uterus firmed up nicely   with bimanual massage and IV Pitocin.   mL. Mom and baby are doing   well.  Apgars were 8 and 9, weight is pending.  Sponge counts were correct.    There were no needles used.        ______________________________  Keeley Hawley MD    [facility] [place]    DD:  2021 10:02  DT:  2021 13:39    Job#:  [id number]

## 2021-12-16 NOTE — PROGRESS NOTES
Delivery note dictated #[id number]  , male, OA, ap 8&9  Placenta S&I with 3vc  EBL 200ml  Perineum intact

## 2021-12-16 NOTE — PROGRESS NOTES
0720-Assumed the care of a 27y/o G4/3 @38+3wks with NKDA in 221. Patient denies preE, SOB, VB but pain 7/10 due to contractions. Patient desires epidural for pain management. POC discussed with patient.   0737- Lab personnel to the BS to draw blood  0738-patient on EFM/TOCO. IV inserted, LR infusing    0940-Patient verbalized the urge to push; SVE done patient is 9.5/100/0.   0942- Keeley Hawley M.D. on stand-by for delivery, patient  encouraged to breath through each contaction   0945-Keeley Hawley M.D. at the BS. SVE done, patient in complete at this time. Pushing starts  0950- to a viable male infant with Dr. Hawley, New born placed on maternal's chest and immediately handed over to LEON Hutson RN the transition RN at the BS.   0955. Placenta out, intact. No laceration noted per MD. New AGAR 8/ per transition RN    1030-[place] bonding with mom at this time. Will continue to monitor patient.     1100-patient to the  bathroom, kelli care done.  1325-patient ambulates to the bathroom, kelli care done, new pad and underwear applied.

## 2021-12-16 NOTE — H&P
OB H&P:    CC: SROM, increased contractions    HPI:  Ms. Rosana Rick is a 26 y.o.  @ 38w2d by 17 wk US. Pt noted SROM and increased contractions starting at 0500 this morning. She denies significant pain between contractions. No current vaginal bleeding.    Intermittent prenatal care due to insurance changes, displeasure with assigned Ob clinic. She was initiated on Macrobid due to suspected pyelonephritis in pregnancy.    Contractions: Yes   Loss of fluid: Yes   Vaginal bleeding: No   Fetal movement: present      PNC with various providers    PNL:  Rh +,  GC/CT neg/neg, Rubella/HIV/HepB/Trep Ab pending  Glucola: Not completed  GBS Pending      ROS:  Const: denies fevers, general concerns  CV/resp: reports no concerns  GI: denies abd pain, GI concerns  : see HPI  Neuro: denies HA/vision changes    OB History    Para Term  AB Living   4 3 3     3   SAB IAB Ectopic Molar Multiple Live Births             3      # Outcome Date GA Lbr Tam/2nd Weight Sex Delivery Anes PTL Lv   4 Current            3 Term 18 40w0d  3.629 kg (8 lb) F Vag-Spont EPI N JEEVAN   2 Term 12 40w0d  2.722 kg (6 lb) F Vag-Spont EPI N JEEVAN   1 Term 09 40w0d  2.722 kg (6 lb) M Vag-Spont EPI N JEEVAN       GYN:Hx of Gonorrhea, chlamydia; tested negative during this pregnancy    No past medical history on file.    No past surgical history on file.    No current facility-administered medications on file prior to encounter.     Current Outpatient Medications on File Prior to Encounter   Medication Sig Dispense Refill   • Prenatal Multivit-Min-Fe-FA (PRENATAL 1 + IRON PO) Take  by mouth.     • nitrofurantoin (MACROBID) 100 MG Cap Take 1 Capsule by mouth every day. 90 Capsule 1       No family history on file.    Social History     Tobacco Use   • Smoking status: Former Smoker   • Smokeless tobacco: Never Used   Vaping Use   • Vaping Use: Never used   Substance Use Topics   • Alcohol use: Not Currently   • Drug use:  Not Currently     Comment: denies         PE:  Vitals:    21 0735   BP: 132/90   Pulse: (!) 104   Resp: 18   Temp: 36.9 °C (98.5 °F)   TempSrc: Temporal   SpO2: 98%   Weight: 63.5 kg (139 lb 14.4 oz)   Height: 1.524 m (5')     gen: AAO, NAD  abd: soft, gravid, NT  Ext: NT, no edema    SVE: /-1  FHT: 135/moderate variability/+ accels/ - decels  Sandy: Intermittent contractions    A/P: 26 y.o.  @ 38w2d by 17 wk US presents following SROM    - Admit to L&D for expected   - Stop Macrobid at this time for expected delivery  - Prenatal labs including GBS pending      Hussain Marie M.D.

## 2021-12-16 NOTE — PROGRESS NOTES
6142 Report received from JOHN RN, resumed POC. Orientated patient to unit and routine. FOB at beside supportive. Answered all questions and concerns. Helped mother put baby to breast. Patient doing well.

## 2021-12-17 LAB
ERYTHROCYTE [DISTWIDTH] IN BLOOD BY AUTOMATED COUNT: 43 FL (ref 35.9–50)
HCT VFR BLD AUTO: 23.6 % (ref 37–47)
HGB BLD-MCNC: 7.8 G/DL (ref 12–16)
MCH RBC QN AUTO: 28.2 PG (ref 27–33)
MCHC RBC AUTO-ENTMCNC: 33.1 G/DL (ref 33.6–35)
MCV RBC AUTO: 85.2 FL (ref 81.4–97.8)
PLATELET # BLD AUTO: 199 K/UL (ref 164–446)
PMV BLD AUTO: 10.4 FL (ref 9–12.9)
RBC # BLD AUTO: 2.77 M/UL (ref 4.2–5.4)
WBC # BLD AUTO: 10.4 K/UL (ref 4.8–10.8)

## 2021-12-17 PROCEDURE — 36415 COLL VENOUS BLD VENIPUNCTURE: CPT

## 2021-12-17 PROCEDURE — 700102 HCHG RX REV CODE 250 W/ 637 OVERRIDE(OP): Performed by: OBSTETRICS & GYNECOLOGY

## 2021-12-17 PROCEDURE — 85027 COMPLETE CBC AUTOMATED: CPT

## 2021-12-17 PROCEDURE — 700102 HCHG RX REV CODE 250 W/ 637 OVERRIDE(OP): Performed by: NURSE PRACTITIONER

## 2021-12-17 PROCEDURE — 770002 HCHG ROOM/CARE - OB PRIVATE (112)

## 2021-12-17 PROCEDURE — A9270 NON-COVERED ITEM OR SERVICE: HCPCS | Performed by: NURSE PRACTITIONER

## 2021-12-17 PROCEDURE — A9270 NON-COVERED ITEM OR SERVICE: HCPCS | Performed by: OBSTETRICS & GYNECOLOGY

## 2021-12-17 RX ORDER — FERROUS SULFATE 325(65) MG
325 TABLET ORAL 2 TIMES DAILY WITH MEALS
Status: DISCONTINUED | OUTPATIENT
Start: 2021-12-17 | End: 2021-12-18 | Stop reason: HOSPADM

## 2021-12-17 RX ADMIN — IBUPROFEN 800 MG: 800 TABLET, FILM COATED ORAL at 11:19

## 2021-12-17 RX ADMIN — DOCUSATE SODIUM 100 MG: 100 CAPSULE, LIQUID FILLED ORAL at 07:33

## 2021-12-17 RX ADMIN — FERROUS SULFATE TAB 325 MG (65 MG ELEMENTAL FE) 325 MG: 325 (65 FE) TAB at 17:49

## 2021-12-17 RX ADMIN — FERROUS SULFATE TAB 325 MG (65 MG ELEMENTAL FE) 325 MG: 325 (65 FE) TAB at 07:33

## 2021-12-17 RX ADMIN — IBUPROFEN 800 MG: 800 TABLET, FILM COATED ORAL at 19:40

## 2021-12-17 RX ADMIN — ACETAMINOPHEN 650 MG: 325 TABLET, FILM COATED ORAL at 11:19

## 2021-12-17 ASSESSMENT — EDINBURGH POSTNATAL DEPRESSION SCALE (EPDS)
I HAVE BEEN ABLE TO LAUGH AND SEE THE FUNNY SIDE OF THINGS: AS MUCH AS I ALWAYS COULD
I HAVE BEEN SO UNHAPPY THAT I HAVE BEEN CRYING: ONLY OCCASIONALLY
THE THOUGHT OF HARMING MYSELF HAS OCCURRED TO ME: NEVER
I HAVE FELT SAD OR MISERABLE: NOT VERY OFTEN
I HAVE BLAMED MYSELF UNNECESSARILY WHEN THINGS WENT WRONG: NOT VERY OFTEN
I HAVE LOOKED FORWARD WITH ENJOYMENT TO THINGS: AS MUCH AS I EVER DID
I HAVE BEEN ANXIOUS OR WORRIED FOR NO GOOD REASON: NO, NOT AT ALL
I HAVE FELT SCARED OR PANICKY FOR NO GOOD REASON: YES, SOMETIMES
THINGS HAVE BEEN GETTING ON TOP OF ME: NO, I HAVE BEEN COPING AS WELL AS EVER
I HAVE BEEN SO UNHAPPY THAT I HAVE HAD DIFFICULTY SLEEPING: NOT VERY OFTEN

## 2021-12-17 ASSESSMENT — PAIN DESCRIPTION - PAIN TYPE
TYPE: ACUTE PAIN
TYPE: ACUTE PAIN

## 2021-12-17 NOTE — DISCHARGE PLANNING
Discharge Planning Assessment Post Partum     Reason for Referral: One prenatal visit and MOB and infant tested positive for amphetamines  Address: [address] [place] [id number]  Phone: [phone]  Type of Living Situation: living with FOB  Mom Diagnosis: Pregnancy  Baby Diagnosis: [place]-38.3 weeks  Primary Language: English     Name of Baby: Vito Guerrier (: 21)  Father of the Baby: Mata Guerrier (: 01)  Involved in baby’s care? Yes  Contact Information: [phone]     Prenatal Care: 1 visit-MOB stated it was due to insurance issues  Mom's PCP: None  PCP for new baby: Pediatrician list provided     Support System: FOB and MOB's Aunt-Gretel Crystal ([phone])  Coping/Bonding between mother & baby: Yes  Source of Feeding: planning on breast feeding but UDS came back positive for amphetamines so will need to bottle feed  Supplies for Infant: MOB states she has the basics but is still needing to get some more items     Mom's Insurance: Medicaid  Baby Covered on Insurance:Yes  Mother Employed/School: Not currently  Other children in the home/names & ages: three children: 12 year old Weston Angeles (09), 8 year old Rita Rick (13), and 3 year old Kita Angeles (18).  SW asked if the children were living with her and MOB stated the two oldest live with their Paternal Grandmother and the youngest is living with her Aunt-Gretel Crystal     Financial Hardship/Income: No, FOB is employed at [employer]  Mom's Mental status: alert and oriented  Services used prior to admit: Medicaid and food stamps     CPS History: Report was called in to Lian Montana with [facility] due to MOB and infant testing positive for amphetamines.  The report was assigned to Latoya Benedict ([phone]).  Met with Latoya and provided her mom and baby's medical records  Psychiatric History: No  Domestic Violence History: No  Drug/ETOH History: MOB and infant's UDS are both positive for  amphetamines.  Asked MOB about her drug use and MOB denies using meth.  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  Waiting for CPS to assess and determine a safe discharge plan for baby.

## 2021-12-17 NOTE — PROGRESS NOTES
POSTPARTUM    PROGRESS  NOTE;    PATIENT ID:  NAME:  Rosana Rick  MRN:               [id number]  YOB: 1995         26 y.o. female  at 38w2d PPD#1 s/p     Subjective: doing well,no vag bleeding    Objective:    Vitals:    21 1400 21 1800 21 2200 21 0200   BP: 108/78 112/80 123/80 108/65   Pulse: 76 78 71 84   Resp:  18 19 18   Temp: 36.9 °C (98.4 °F) 36.6 °C (97.9 °F) 36.7 °C (98 °F) 36.7 °C (98.1 °F)   TempSrc: Temporal Temporal Temporal Temporal   SpO2:  98% 100% 98%   Weight:       Height:         General: No acute distress, resting comfortably in bed.  HEENT: normocephalic, nontraumatic, PERRLA, EOMI  Cardiovascular: Heart RRR with no murmurs, rubs or gallops. Distal Pulses 2+  Respiratory: symmetric chest expansion, lungs CTA bilaterally with no wheezes rales or rhonci  Abdomen: soft, mildly tender, fundus firm, +BS  Genitourinary: lochia light, denies excessive vaginal bleeding  Musculoskeletal: strength 5/5 in four extremities  Neuro: non focal with no numbness, tingling or changes in sensation    Recent Labs     21  0815 21  1947 21  0453   WBC 7.6 11.3* 10.4   RBC 4.49 2.83* 2.77*   HEMOGLOBIN 12.5 8.0* 7.8*   HEMATOCRIT 37.3 23.7* 23.6*   MCV 83.1 83.7 85.2   MCH 27.8 28.3 28.2   RDW 42.3 42.3 43.0   PLATELETCT 194 182 199   MPV 10.2 10.9 10.4   NEUTSPOLYS 66.70  --   --    LYMPHOCYTES 23.40  --   --    MONOCYTES 7.50  --   --    EOSINOPHILS 1.60  --   --    BASOPHILS 0.30  --   --      No results for input(s): SODIUM, POTASSIUM, CHLORIDE, CO2, GLUCOSE, BUN, CPKTOTAL in the last 72 hours.    Current Meds:   Current Facility-Administered Medications   Medication Dose Frequency Provider Last Rate Last Admin   • oxytocin (PITOCIN) infusion (for postpartum)   mL/hr Continuous Alejandra Mayorga, ANGELES.P.R.N. 50 mL/hr at 21 1900 50 mL/hr at 21 1900   • ibuprofen (MOTRIN) tablet 800 mg  800 mg Q8HRS PRN ANGELES David.P.R.N.    800 mg at 21   • acetaminophen (Tylenol) tablet 325 mg  325 mg Q4HRS PRN Alejandra Mayorga, A.P.R.N.       • acetaminophen (Tylenol) tablet 650 mg  650 mg Q4HRS PRN Alejandra Mayorga, A.P.R.N.       • HYDROcodone-acetaminophen (NORCO) 5-325 MG per tablet 2 Tablet  2 Tablet Q4HRS PRN Alejandra Mayorga A.P.R.N.   2 Tablet at 21 1043   • LR infusion   PRN Kemal Cullen M.D.       • tetanus-dipth-acell pertussis (Tdap) inj 0.5 mL  0.5 mL Once PRN Kemal Cullen M.D.       • PRN oxytocin (PITOCIN) (20 Units/1000 mL) PRN for excessive uterine bleeding - See Admin Instr  125-999 mL/hr Once PRN Kemal Cullen M.D.       • miSOPROStol (CYTOTEC) tablet 600 mcg  600 mcg Once PRN Kemal Cullen M.D.       • methylergonovine (METHERGINE) injection 0.2 mg  0.2 mg Once PRN Kemal Cullen M.D.       • docusate sodium (COLACE) capsule 100 mg  100 mg BID PRN Kemal Cullen M.D.   100 mg at 21   • oxyCODONE-acetaminophen (PERCOCET) 5-325 MG per tablet 1 Tablet  1 Tablet Q4HRS PRN Kemal Cullen M.D.       Last reviewed on 2021  9:22 AM by Michael Adams R.N.       Assessment:  26 y.o. female  at 38w2d PPD#1 s/p ,PP anemia likely due to bleeding at time of delivery-poor estimate of EBL    Plan:   1. Recheck CBC in AM due to significant drop   2. Discharge       Kemal Cullen MD

## 2021-12-17 NOTE — CARE PLAN
The patient is Stable - Low risk of patient condition declining or worsening    Shift Goals  Clinical Goals: lochia wdl  Patient Goals: Work on breastfeeding throught stay   Family Goals: Bonding     Progress made toward(s) clinical / shift goals:  FF@U with light lochia    Patient is not progressing towards the following goals:

## 2021-12-17 NOTE — PROGRESS NOTES
1915 - Bedside report received from dayshift RN. 12hr chart check complete, MAR and orders reviewed.    2145 - CHRISTELLE. Rafael KENNEY notified of pt drop in H/H from 12.5/37.3 to 8.0/23.7. TORV to repeat CBC in AM at 0500 and monitor for any s/s anemia.

## 2021-12-17 NOTE — CARE PLAN
Problem: Knowledge Deficit - Postpartum  Goal: Patient will verbalize and demonstrate understanding of self and infant care  Outcome: Progressing     Problem: Altered Physiologic Condition  Goal: Patient physiologically stable as evidenced by normal lochia, palpable uterine involution and vitals within normal limits  Outcome: Progressing     Problem: Infection - Postpartum  Outcome: Progressing     The patient is Stable - Low risk of patient condition declining or worsening    Shift Goals  Clinical Goals: pain control, adequate I/O  Patient Goals: Work on breastfeeding throught stay   Family Goals: Bonding     Progress made toward(s) clinical / shift goals:  Pt reoriented to room, unit, and plan of care as needed. Lochia remains light without clots expressed, pt performing kelli care without difficulty, no s/s infection noted on assessment, remains afebrile.    Patient is not progressing towards the following goals: NA

## 2021-12-17 NOTE — PROGRESS NOTES
Assessment done vital signs stable. Patient progressing according to plan of care. Fundus firm at the umbilicus with light lochia. Patient up voiding without difficulty. Ambulating with steady gait. Bottle feeding infant on demand. Patient claims she will call for medications or any needs

## 2021-12-17 NOTE — DISCHARGE PLANNING
:     Received a call from Supervisor-Sridevi Sánchez with [facility] ( [phone]).  Sridevi asked if FOB was on the Birth Certificate.  Spoke with Maria Ines-Birth Certificates who stated the MOB is planning on adding FOB onto the birth certificate and they are just waiting for him to show up.  Discussed with Sridevi.     Sridevi asked that we contact [facility] tomorrow when baby is ready for discharge.  [facility] would like to know if the FOB was added onto the birth certificate.     Plan:  Contact [facility] ([phone]) tomorrow if baby is ready for discharge.  Check with Birth Certificates ([id number]) to find out if the FOB was added on as CPS needs to have that information.

## 2021-12-18 ENCOUNTER — PHARMACY VISIT (OUTPATIENT)
Dept: PHARMACY | Facility: MEDICAL CENTER | Age: 26
End: 2021-12-18
Payer: COMMERCIAL

## 2021-12-18 VITALS
RESPIRATION RATE: 18 BRPM | BODY MASS INDEX: 27.47 KG/M2 | TEMPERATURE: 98.1 F | WEIGHT: 139.9 LBS | HEART RATE: 99 BPM | HEIGHT: 60 IN | OXYGEN SATURATION: 96 % | SYSTOLIC BLOOD PRESSURE: 112 MMHG | DIASTOLIC BLOOD PRESSURE: 69 MMHG

## 2021-12-18 PROBLEM — Z34.82 ENCOUNTER FOR SUPERVISION OF OTHER NORMAL PREGNANCY, SECOND TRIMESTER: Status: RESOLVED | Noted: 2021-09-13 | Resolved: 2021-12-18

## 2021-12-18 LAB
BACTERIA UR CULT: ABNORMAL
BACTERIA UR CULT: ABNORMAL
BASOPHILS # BLD AUTO: 0.3 % (ref 0–1.8)
BASOPHILS # BLD: 0.02 K/UL (ref 0–0.12)
EOSINOPHIL # BLD AUTO: 0.22 K/UL (ref 0–0.51)
EOSINOPHIL NFR BLD: 2.8 % (ref 0–6.9)
ERYTHROCYTE [DISTWIDTH] IN BLOOD BY AUTOMATED COUNT: 43.8 FL (ref 35.9–50)
HCT VFR BLD AUTO: 20.7 % (ref 37–47)
HGB BLD-MCNC: 7 G/DL (ref 12–16)
IMM GRANULOCYTES # BLD AUTO: 0.11 K/UL (ref 0–0.11)
IMM GRANULOCYTES NFR BLD AUTO: 1.4 % (ref 0–0.9)
LYMPHOCYTES # BLD AUTO: 2.26 K/UL (ref 1–4.8)
LYMPHOCYTES NFR BLD: 29.1 % (ref 22–41)
MCH RBC QN AUTO: 28.8 PG (ref 27–33)
MCHC RBC AUTO-ENTMCNC: 33.8 G/DL (ref 33.6–35)
MCV RBC AUTO: 85.2 FL (ref 81.4–97.8)
MONOCYTES # BLD AUTO: 0.55 K/UL (ref 0–0.85)
MONOCYTES NFR BLD AUTO: 7.1 % (ref 0–13.4)
NEUTROPHILS # BLD AUTO: 4.6 K/UL (ref 2–7.15)
NEUTROPHILS NFR BLD: 59.3 % (ref 44–72)
NRBC # BLD AUTO: 0.02 K/UL
NRBC BLD-RTO: 0.3 /100 WBC
PLATELET # BLD AUTO: 220 K/UL (ref 164–446)
PMV BLD AUTO: 9.9 FL (ref 9–12.9)
RBC # BLD AUTO: 2.43 M/UL (ref 4.2–5.4)
SIGNIFICANT IND 70042: ABNORMAL
SITE SITE: ABNORMAL
SOURCE SOURCE: ABNORMAL
WBC # BLD AUTO: 7.8 K/UL (ref 4.8–10.8)

## 2021-12-18 PROCEDURE — A9270 NON-COVERED ITEM OR SERVICE: HCPCS | Performed by: NURSE PRACTITIONER

## 2021-12-18 PROCEDURE — 700102 HCHG RX REV CODE 250 W/ 637 OVERRIDE(OP): Performed by: NURSE PRACTITIONER

## 2021-12-18 PROCEDURE — RXMED WILLOW AMBULATORY MEDICATION CHARGE: Performed by: ADVANCED PRACTICE MIDWIFE

## 2021-12-18 PROCEDURE — 36415 COLL VENOUS BLD VENIPUNCTURE: CPT

## 2021-12-18 PROCEDURE — 85025 COMPLETE CBC W/AUTO DIFF WBC: CPT

## 2021-12-18 PROCEDURE — 700111 HCHG RX REV CODE 636 W/ 250 OVERRIDE (IP): Performed by: ADVANCED PRACTICE MIDWIFE

## 2021-12-18 RX ORDER — FERROUS SULFATE 325(65) MG
325 TABLET ORAL 2 TIMES DAILY WITH MEALS
Qty: 60 TABLET | Refills: 1 | Status: SHIPPED | OUTPATIENT
Start: 2021-12-18 | End: 2023-08-22

## 2021-12-18 RX ORDER — MEDROXYPROGESTERONE ACETATE 150 MG/ML
150 INJECTION, SUSPENSION INTRAMUSCULAR ONCE
Status: COMPLETED | OUTPATIENT
Start: 2021-12-18 | End: 2021-12-18

## 2021-12-18 RX ORDER — IBUPROFEN 800 MG/1
800 TABLET ORAL EVERY 8 HOURS PRN
Qty: 30 TABLET | Refills: 1 | Status: SHIPPED | OUTPATIENT
Start: 2021-12-18 | End: 2023-08-22

## 2021-12-18 RX ADMIN — FERROUS SULFATE TAB 325 MG (65 MG ELEMENTAL FE) 325 MG: 325 (65 FE) TAB at 08:10

## 2021-12-18 RX ADMIN — MEDROXYPROGESTERONE ACETATE 150 MG: 150 INJECTION, SUSPENSION, EXTENDED RELEASE INTRAMUSCULAR at 15:00

## 2021-12-18 RX ADMIN — IBUPROFEN 800 MG: 800 TABLET, FILM COATED ORAL at 06:07

## 2021-12-18 ASSESSMENT — PAIN DESCRIPTION - PAIN TYPE: TYPE: ACUTE PAIN

## 2021-12-18 NOTE — DISCHARGE SUMMARY
Discharge Summary:      Rosana Rick    Admit Date:   2021  Discharge Date:  2021     Admitting diagnosis:  Indication for care in labor or delivery [O75.9]  Discharge Diagnosis: Status post vaginal, spontaneous.  Pregnancy Complications: positive UDS  Tubal Ligation:  no        History:  History reviewed. No pertinent past medical history.  OB History    Para Term  AB Living   4 4 4     4   SAB IAB Ectopic Molar Multiple Live Births           0 4      # Outcome Date GA Lbr Tam/2nd Weight Sex Delivery Anes PTL Lv   4 Term 21 38w2d / 00:10 3.14 kg (6 lb 14.8 oz) M Vag-Spont None N JEEVAN   3 Term 18 40w0d  3.629 kg (8 lb) F Vag-Spont EPI N JEEVAN   2 Term 12 40w0d  2.722 kg (6 lb) F Vag-Spont EPI N JEEVAN   1 Term 09 40w0d  2.722 kg (6 lb) M Vag-Spont EPI N JEEVAN        Nkda [no known drug allergy]  Patient Active Problem List    Diagnosis Date Noted   • Indication for care in labor or delivery 2021   • ASCUS with positive high risk HPV cervical 2021        Hospital Course:   26 y.o. , now para 4, was admitted with the above mentioned diagnosis, underwent Active Labor, vaginal, spontaneous. Patient postpartum course was unremarkable, with progressive advancement in diet , ambulation and toleration of oral analgesia. Patient without complaints today and desires discharge.      Vitals:    21 2200 21 0200 21 1839 21 0200   BP:  108/65 113/76 111/67   Pulse: 71 84 94 90   Resp: 19 18 14 18   Temp: 36.7 °C (98 °F) 36.7 °C (98.1 °F) 36.5 °C (97.7 °F) 36.9 °C (98.4 °F)   TempSrc: Temporal Temporal Temporal Temporal   SpO2: 100% 98% 96% 97%   Weight:       Height:           Current Facility-Administered Medications   Medication Dose   • ferrous sulfate tablet 325 mg  325 mg   • oxytocin (PITOCIN) infusion (for postpartum)   mL/hr   • ibuprofen (MOTRIN) tablet 800 mg  800 mg   • acetaminophen (Tylenol) tablet 325 mg  325 mg   •  acetaminophen (Tylenol) tablet 650 mg  650 mg   • HYDROcodone-acetaminophen (NORCO) 5-325 MG per tablet 2 Tablet  2 Tablet   • LR infusion     • tetanus-dipth-acell pertussis (Tdap) inj 0.5 mL  0.5 mL   • PRN oxytocin (PITOCIN) (20 Units/1000 mL) PRN for excessive uterine bleeding - See Admin Instr  125-999 mL/hr   • miSOPROStol (CYTOTEC) tablet 600 mcg  600 mcg   • methylergonovine (METHERGINE) injection 0.2 mg  0.2 mg   • docusate sodium (COLACE) capsule 100 mg  100 mg   • oxyCODONE-acetaminophen (PERCOCET) 5-325 MG per tablet 1 Tablet  1 Tablet       Exam:  Breast Exam: negative  Abdomen: Abdomen soft, non-tender. BS normal. No masses,  No organomegaly  Fundus Non Tender: yes  Incision: none  Perineum: perineum intact  Extremity: extremities, peripheral pulses and reflexes normal     Labs:  Recent Labs     12/16/21  1947 12/17/21  0453 12/18/21  0045   WBC 11.3* 10.4 7.8   RBC 2.83* 2.77* 2.43*   HEMOGLOBIN 8.0* 7.8* 7.0*   HEMATOCRIT 23.7* 23.6* 20.7*   MCV 83.7 85.2 85.2   MCH 28.3 28.2 28.8   MCHC 33.8 33.1* 33.8   RDW 42.3 43.0 43.8   PLATELETCT 182 199 220   MPV 10.9 10.4 9.9        Activity:   Discharge to home  Pelvic Rest x 6 weeks    Assessment:  normal postpartum course and good candidate for Depo-Provera injections  Discharge Assessment: No areas of skin breakdown/redness; surgical incision intact/healing     Follow up: [facility] in 5 weeks for vaginal     Discharge Meds:   Current Outpatient Medications   Medication Sig Dispense Refill   • ferrous sulfate 325 (65 Fe) MG tablet Take 1 Tablet by mouth 2 times a day with meals. 60 Tablet 1   • ibuprofen (MOTRIN) 800 MG Tab Take 1 Tablet by mouth every 8 hours as needed (For cramping after delivery). 30 Tablet 1     I reviewed in detail with patient  all indications that would necessitate emergency care. We reviewed bleeding expectations as well as expected healing with perineal repairs. We discussed birth control options and she is aware that  can order this at her 6 week PP appointment. Finally, we reviewed postpartum depression and anxiety. She verbalizes understanding.  I have encouraged pelvic rest and use of condom if she does desire to have intercourse.    Reviewed anemia in detail with patient. Depo Provera prior to discharge.     CARA Clarke

## 2021-12-18 NOTE — DISCHARGE PLANNING
Confirmed with birth certificates that FOB was added on to birth certificate, however FOB hasn't arrived to sign yet.     Called [facility] and spoke with Lian. She states infant is cleared to d/c with FOB once FOB signs the birth certificate. She requested a phone call to be notified as soon as POB are leaving and their agency will meet them at their house.     BSN updated     [id number]- Lian w/ [facility] notified that FOB arrived and infant is currently discharging. [facility] will meet family at the home

## 2021-12-18 NOTE — DISCHARGE PLANNING
Meds-to-Beds: Discharge prescription orders listed below delivered to patient's bedside. EMELY Story notified. Patient will call with questions.    Current Outpatient Medications   Medication Sig Dispense Refill   • ferrous sulfate 325 (65 Fe) MG tablet Take 1 Tablet by mouth 2 times a day with meals. 60 Tablet 1   • ibuprofen (MOTRIN) 800 MG Tab Take 1 Tablet by mouth every 8 hours as needed (For cramping after delivery). 30 Tablet 1      Nika Renteria, PharmD

## 2021-12-18 NOTE — DISCHARGE INSTRUCTIONS
PATIENT DISCHARGE EDUCATION INSTRUCTION SHEET  REASONS TO CALL YOUR OBSTETRICIAN  · Persistent fever, shaking, chills (Temperature higher than 100.4) may indicate you have an infection  · Heavy bleeding: soaking more than 1 pad per hour; Passing clots an egg-sized clot or bigger may mean you have an postpartum hemorrhage  · Foul odor from vagina or bad smelling or discolored discharge or blood  · Breast infection (Mastitis symptoms); breast pain, chills, fever, redness or red streaks, may feel flu like symptoms  · Urinary pain, burning or frequency  · Incision that is not healing, increased redness, swelling, tenderness or pain, or any pus from episiotomy or  site may mean you have an infection  · Redness, swelling, warmth, or painful to touch in the calf area of your leg may mean you have a blood clot  · Severe or intensified depression, thoughts or feelings of wanting to hurt yourself or someone else   · Pain in chest, obstructed breathing or shortness of breath (trouble catching your breath) may mean you are having a postpartum complication. Call your provider immediately   · Headache that does not get better, even after taking medicine, a bad headache with vision changes or pain in the upper right area of your belly may mean you have high blood pressure or post birth preeclampsia. Call your provider immediately    HAND WASHING  All family and friends should wash their hands:  · Before and after holding the baby  · Before feeding the baby  · After using the restroom or changing the baby's diaper    WOUND CARE  Ask your physician for additional care instructions. In general:  ·  Incision:  · May shower and pat incision dry   · Keep the incision clean and dry  · There should not be any opening or pus from the incision  · Continue to walk at home 3 times a day   · Do NOT lift anything heavier than your baby (over 10 pounds)  · Encourage family to help participate in care of the  to allow  rest and mom time to heal  · Episiotomy/Laceration  · May use kelli-spray bottle, witch hazel pads and dermaplast spray for comfort  · Use kelli-spray bottle after urinating to cleanse perineal area  · To prevent burning during urination spray kelli-water bottle on labial area   · Pat perineal area dry until episiotomy/laceration is healed  · Continue to use kelli-bottle until bleeding stops as needed  · If have a 2nd degree laceration or greater, a Sitz bath can offer relief from soreness, burning, and inflammation   · Sitz Bath   · Sit in 6 inches of warm water and soak laceration as needed until the laceration heals    VAGINAL CARE AND BLEEDING  · Nothing inside vagina for 6 weeks:   · No sexual intercourse, tampons or douching  · Bleeding may continue for 2-4 weeks. Amount and color may vary  · Soaking 1 pad or more in an hour for several hours is considered heavy bleeding  · Passing large egg sized blood clots can be concerning  · If you feel like you have heavy bleeding or are having increasing amount of blood clots call your Obstetrician immediately  · If you begin feeling faint upon standing, feeling sick to your stomach, have clammy skin, a really fast heartbeat, have chills, start feeling confused, dizzy, sleepy or weak, or feeling like you're going to faint call your Obstetrician immediately    HYPERTENSION   Preeclampsia or gestational hypertension are types of high blood pressure that only pregnant women can get. It is important for you to be aware of symptoms to seek early intervention and treatment. If you have any of these symptoms immediately call your Obstetrician    · Vision changes or blurred vision   · Severe headache or pain that is unrelieved with medication and will not go away  · Persistent pain in upper abdomen or shoulder   · Increased swelling of face, feet, or hands  · Difficulty breathing or shortness of breath at rest  · Urinating less than usual    URINATION AND BOWEL MOVEMENTS  · Eating  "more fiber (bran cereal, fruits, and vegetables) and drinking plenty of fluids will help to avoid constipation  · Urinary frequency and urgency after childbirth is normal  · If you experience any urinary pain, burning or frequency call your provider    BIRTH CONTROL  · It is possible to become pregnant at any time after delivery and while breastfeeding  · Plan to discuss a method of birth control with your physician at your post delivery follow up visit    POSTPARTUM BLUES  During the first few days after birth, you may experience a sense of the \"blues\" which may include impatience, irritability or even crying. These feelings come and go quickly. However, as many as 1 in 10 women experience emotional symptoms known as postpartum depression.     POSTPARTUM DEPRESSION    May start as early as the second or third day after delivery or take several weeks or months to develop. Symptoms of \"blues\" are present, but are more intense: Crying spells; loss of appetite; feelings of hopelessness or loss of control; fear of touching the baby; over concern or no concern at all about the baby; little or no concern about your own appearance/caring for yourself; and/or inability to sleep or excessive sleeping. Contact your Obstetrician if you are experiencing any of these symptoms     PREVENTING SHAKEN BABY  If you are angry or stressed, PUT THE BABY IN THE CRIB, step away, take some deep breaths, and wait until you are calm to care for the baby. DO NOT SHAKE THE BABY. You are not alone, call a supporter for help.  · [facility] 24/7 crisis call line (305-925-9790) or (1-161.390.6277)  · You can also text them, text \"ANSWER\" (179351)      "

## 2021-12-18 NOTE — CARE PLAN
Problem: Psychosocial - Postpartum  Goal: Patient will verbalize and demonstrate effective bonding and parenting behavior  Outcome: Progressing     Problem: Altered Physiologic Condition  Goal: Patient physiologically stable as evidenced by normal lochia, palpable uterine involution and vitals within normal limits  Outcome: Progressing   The patient is Stable - Low risk of patient condition declining or worsening    Shift Goals  Clinical Goals: Pain management, normal lochia  Patient Goals: Work on breastfeeding throught stay   Family Goals: Bonding     Progress made toward(s) clinical / shift goals:      Patient is not progressing towards the following goals:

## 2021-12-18 NOTE — CARE PLAN
The patient is Stable - Low risk of patient condition declining or worsening    Shift Goals  Clinical Goals: Pain management, normal lochia  Patient Goals: Work on breastfeeding throught stay   Family Goals: Bonding     Progress made toward(s) clinical / shift goals:  Patient reports adequate pain management, bleeding and lochia changes are normal    Patient is not progressing towards the following goals:

## 2021-12-18 NOTE — PROGRESS NOTES
Assessed patient, vital signs stable, fundus firm , lochia light, educated on safe sleep.  Patient will call for pain medication.

## 2022-06-06 NOTE — ED AVS SNAPSHOT
7/7/2017    Rosana Rick  [address] [facility] [address]    Dear Rosana:    [facility] wants to ensure your discharge home is safe and you or your loved ones have had all of your questions answered regarding your care after you leave the hospital.    Below is a list of resources and contact information should you have any questions regarding your hospital stay, follow-up instructions, or active medical symptoms.    Questions or Concerns Regarding… Contact   Medical Questions Related to Your Discharge  (7 days a week, 8am-5pm) Contact a Nurse Care Coordinator   [phone]   Medical Questions Not Related to Your Discharge  (24 hours a day / 7 days a week)  Contact the Nurse Health Line   [phone]    Medications or Discharge Instructions Refer to your discharge packet   or contact your [facility] Primary Care Provider   [phone]   Follow-up Appointment(s) Schedule your appointment via [employer]   or contact Scheduling [phone]   Billing Review your statement via [employer]  or contact Billing [phone]   Medical Records Review your records via [employer]   or contact Medical Records [phone]     You may receive a telephone call within two days of discharge. This call is to make certain you understand your discharge instructions and have the opportunity to have any questions answered. You can also easily access your medical information, test results and upcoming appointments via the [employer] free online health management tool. You can learn more and sign up at [employer]. For assistance setting up your [employer] account, please call [phone].    Once again, we want to ensure your discharge home is safe and that you have a clear understanding of any next steps in your care. If you have any questions or concerns, please do not hesitate to contact us, we are here for you. Thank you for choosing [facility] for your healthcare needs.    Sincerely,    Your [facility] Healthcare Team          ----- Message from LONDON Little sent at 6/2/2022  9:05 AM CDT -----  Stress test showed no evidence of any blockage. Continue medication. Keep follow-up in November.   Let us know if symptoms worsen or return

## 2023-07-17 ENCOUNTER — TELEPHONE (OUTPATIENT)
Dept: OBGYN | Facility: CLINIC | Age: 28
End: 2023-07-17

## 2023-07-18 ENCOUNTER — APPOINTMENT (OUTPATIENT)
Dept: RADIOLOGY | Facility: IMAGING CENTER | Age: 28
End: 2023-07-18
Payer: MEDICAID

## 2023-07-18 DIAGNOSIS — Z3A.20 20 WEEKS GESTATION OF PREGNANCY: ICD-10-CM

## 2023-07-18 PROCEDURE — 76805 OB US >/= 14 WKS SNGL FETUS: CPT | Mod: TC | Performed by: NURSE PRACTITIONER

## 2023-07-25 ENCOUNTER — HOSPITAL ENCOUNTER (OUTPATIENT)
Facility: MEDICAL CENTER | Age: 28
End: 2023-07-25
Payer: MEDICAID

## 2023-07-25 ENCOUNTER — INITIAL PRENATAL (OUTPATIENT)
Dept: OBGYN | Facility: CLINIC | Age: 28
End: 2023-07-25
Payer: MEDICAID

## 2023-07-25 VITALS — DIASTOLIC BLOOD PRESSURE: 50 MMHG | BODY MASS INDEX: 26.25 KG/M2 | WEIGHT: 134.4 LBS | SYSTOLIC BLOOD PRESSURE: 102 MMHG

## 2023-07-25 DIAGNOSIS — F15.91 HISTORY OF METHAMPHETAMINE USE: ICD-10-CM

## 2023-07-25 DIAGNOSIS — R87.610 ASCUS WITH POSITIVE HIGH RISK HPV CERVICAL: ICD-10-CM

## 2023-07-25 DIAGNOSIS — E04.1 THYROID NODULE: ICD-10-CM

## 2023-07-25 DIAGNOSIS — O09.92 SUPERVISION OF HIGH RISK PREGNANCY IN SECOND TRIMESTER: ICD-10-CM

## 2023-07-25 DIAGNOSIS — R87.810 ASCUS WITH POSITIVE HIGH RISK HPV CERVICAL: ICD-10-CM

## 2023-07-25 PROCEDURE — 87491 CHLMYD TRACH DNA AMP PROBE: CPT

## 2023-07-25 PROCEDURE — 87624 HPV HI-RISK TYP POOLED RSLT: CPT

## 2023-07-25 PROCEDURE — 3078F DIAST BP <80 MM HG: CPT

## 2023-07-25 PROCEDURE — 88175 CYTOPATH C/V AUTO FLUID REDO: CPT

## 2023-07-25 PROCEDURE — 87591 N.GONORRHOEAE DNA AMP PROB: CPT

## 2023-07-25 PROCEDURE — 3074F SYST BP LT 130 MM HG: CPT

## 2023-07-25 PROCEDURE — 0500F INITIAL PRENATAL CARE VISIT: CPT

## 2023-07-25 ASSESSMENT — EDINBURGH POSTNATAL DEPRESSION SCALE (EPDS)
I HAVE BEEN SO UNHAPPY THAT I HAVE BEEN CRYING: NO, NEVER
I HAVE FELT SCARED OR PANICKY FOR NO GOOD REASON: NO, NOT MUCH
TOTAL SCORE: 6
I HAVE FELT SAD OR MISERABLE: NO, NOT AT ALL
I HAVE BLAMED MYSELF UNNECESSARILY WHEN THINGS WENT WRONG: NOT VERY OFTEN
I HAVE BEEN ABLE TO LAUGH AND SEE THE FUNNY SIDE OF THINGS: AS MUCH AS I ALWAYS COULD
I HAVE LOOKED FORWARD WITH ENJOYMENT TO THINGS: AS MUCH AS I EVER DID
I HAVE BEEN SO UNHAPPY THAT I HAVE HAD DIFFICULTY SLEEPING: NOT VERY OFTEN
THE THOUGHT OF HARMING MYSELF HAS OCCURRED TO ME: NEVER
I HAVE BEEN ANXIOUS OR WORRIED FOR NO GOOD REASON: HARDLY EVER
THINGS HAVE BEEN GETTING ON TOP OF ME: YES, SOMETIMES I HAVEN'T BEEN COPING AS WELL AS USUAL

## 2023-07-25 NOTE — PROGRESS NOTES
"CC: New Ob visit     Subjective Rosana Rick  20w4d 19w4d US (inconsistent LMP by 10 days) presents for prenatal care. Today is her first prenatal visit at [facility]. She is >20 min late for visit.   I have reviewed the patients' medical, surgical, gynecological, obstetrical, social, and family histories, medications and available lab results and pertinent notes are as follows:   No ER visits in this pregnancy  No previous care in this pregnancy.   She has complaints of difficulty sleeping due to discomforts of pregnancy and heat, otherwise feeling well.    Genetic screening options: Desires AFP tetra.  Declines carrier screening. Declines NIPTs.    Reports + FM. Denies VB, LOF, or cramping.  Denies dysuria, vaginal DC.    Pt is partnered with \"Jaspreet\" and lives with him and kids. FOB is involved and supportive, same FOB as youngest child only. She is currently not working outside the home, no heavy lifting, no chemical exposure.    Pregnancy is unplanned but desired.      OB History    Para Term  AB Living   5 4 4 0 0 4   SAB IAB Ectopic Molar Multiple Live Births   0 0 0 0 0 4       Past Gynecological History:  Denies fibroids, ovarian cysts. She denies ever having surgery on her cervix, uterus, or ovaries in the past.    Last pap smear was 21 - ASCUS/+HR HPV, patient did not follow up and did not get colpo as indicated at that time  Past OB hx includes 4 prior term SVDs that she reports were uncomplicated, denies hx PPH, shoulder dystocia, retained placenta, or other complications.   History of HSV I or II in self or partner: Denies  History of STIs in self or partner: Denies but chart review shows she was + for CT and GC in 2017  History of Thyroid problems: denies    History of depression or anxiety Denies  EPDS = 6    History reviewed. No pertinent past medical history.  Past Surgical History:   Procedure Laterality Date    TONSILLECTOMY        Social History "     Socioeconomic History    Marital status: Single     Spouse name: Not on file    Number of children: Not on file    Years of education: Not on file    Highest education level: Not on file   Occupational History    Not on file   Tobacco Use    Smoking status: Never    Smokeless tobacco: Never   Vaping Use    Vaping Use: Former   Substance and Sexual Activity    Alcohol use: Not Currently    Drug use: Not Currently     Types: Marijuana     Comment: last used in 2022    Sexual activity: Yes     Partners: Male     Birth control/protection: None     Comment: not . Unplanned pregnancy   Other Topics Concern    Not on file   Social History Narrative    Not on file     Social Determinants of Health     Financial Resource Strain: Not on file   Food Insecurity: Not on file   Transportation Needs: Not on file   Physical Activity: Not on file   Stress: Not on file   Social Connections: Not on file   Intimate Partner Violence: Not on file   Housing Stability: Not on file     Family History:   Family History   Problem Relation Age of Onset    No Known Problems Mother     No Known Problems Father        Genetic Screening/Teratology Counseling- Includes patient, baby's father, or anyone in either family with:  Patient's age 35 years or older as of estimated date of delivery: No     Thalassemia (Italian, Greek, Mediterranean, or  background): MCV less than 80: No     Neural tube defect (Meningomyelocele, Spina bifida, or Anencephaly): No     Congenital heart defect: No     Down syndrome: No     Phillip-Sachs (Ashkenazi Cheondoism, Cajun, Equatorial Guinean [place]): No     Canavan disease (Ashkenazi Cheondoism): No     Familial dysautonomia (Ashkenazi Cheondoism): No     Sickle cell disease or trait (): No     Hemophilia or other blood disorders: No     Muscular dystrophy: No    Cystic fibrosis: No     Creek's chorea: No     Mental retardation/autism: No     Other inherited genetic or chromosomal disorder: No     Maternal metabolic  "disorder (eg. Type 1 diabetes, PKU): No     Patient or baby's father had child with birth defects not listed above: No     Recurrent pregnancy loss, or a stillbirth: No     Medications (including supplements, vitamins, herbs, or OTC drugs)/illicit/recreational drugs/alcohol since last menstrual period: No             Infection Prevention  1. High Risk For HIV: No 6. Rash Or Illness Since LMP: No     2. High Risk For Hepatitis B or C: No 7. History Of STD, GC, Chlamydia, HPV Syphilis: No     3. Live With Someone With TB Or Exposed To TB: No 8. Have a cat in the home?: Yes     4. Patient Or Partner Has A History Of Herpes: No 8a. Responsible for changing the litter?: No     5. History of Chicken Pox: Yes (Comment: As a child)    Comments: Unplanned pregnancy but welcomed   FOB \"Jaspreet Guerrier\" involved and supportive. Not  but living together. Same father as the youngest child only.  Pt stays at home.            Objective:   Allergies: Nkda [no known drug allergy]  Objective:/50   Wt 134 lb 6.4 oz      Prenatal Physical:  General Exam:  HEENT: normal    Heart: normal    Thyroid: abnormal    Thyroid comment:  Small right sided thyroid nodule on the superior portion of the thyroid that is mobile, round, and non-fixed. 0.25x0.25 cm approximately.   Lungs: normal    Lymph Nodes: normal    Neurological: normal    Abdomen: normal    Skin: normal    Extremities: normal    Pelvic Exam:  Vulva:  Normal  Vagina:  Normal  Cervix:  Normal  Uterus (wks):  19       Lab: No results found for this or any previous visit (from the past 672 hour(s)).    Ultrasound:  Reviewed initial scan and ARIES is by 19w4d US, inconsistent with LMP by 10 days    Assessment:  --Normal Exam at 20 weeks and 4 days  --Size consistent with dates  --FHR positive  Encounter Diagnoses   Name Primary?    Supervision of high risk pregnancy in second trimester     History of methamphetamine use     ASCUS with positive high risk HPV cervical     " Thyroid nodule         Plan:  Reviewed the patients risk factors for this pregnancy and recommend the need for   Complete OB US already completed, WNL XY  Additional labs/imaging: routine at this time  Antepartum fetal monitoring requirements: routine at this time  2. Genetic screening was discussed with literature on Prenatal Screening, Cystic Fibrosis, and SMA provided and the patient plans to:  - accepted MSAFP tetra, ordered today  - declined carrier testing  - declined NIPTs  3. Discuss importance of adequate water intake, taking PNV, healthy nutritional choices, and avoidence of ETOH/Tobacco/drugs  4. Discuss importance of exercise, as well as rest   5. If has nausea, take Vitamin B6 25mg TID with doxylamine/Unisom 25mg at night  6. Prenatal labs and UDS ordered - lab slip given  7. Discussed OB care at [facility] including midwifery care, group practice, and call schedules  8. GC/CT collected via pap today.    9. Pap collected today, will follow up with results per [facility].  10. Pregnancy guide provided and reviewed safe medications in pregnancy page  11. Follow up in  4 weeks for next visit and PRN      Savita Spencer C.N.M.

## 2023-07-25 NOTE — PROGRESS NOTES
NOB visit   LMP: 03/13/2023 with ARIES pf 12/08/2023  Pt had US on 07/18/23 with ARIES of 12/08/2023  Pt reports light FM   WT: 134.4 lb   BP: 102/50  Good # [phone]  Last pap: 09/13/2021 ASCUS with +HPV  Pt states no complaints or concerns today  Pt desires AFP  NIPT Score: ??

## 2023-07-26 ENCOUNTER — APPOINTMENT (OUTPATIENT)
Dept: OBGYN | Facility: CLINIC | Age: 28
End: 2023-07-26
Payer: MEDICAID

## 2023-07-26 DIAGNOSIS — O09.92 SUPERVISION OF HIGH RISK PREGNANCY IN SECOND TRIMESTER: ICD-10-CM

## 2023-07-28 LAB
C TRACH RRNA CVX QL NAA+PROBE: NEGATIVE
CYTOLOGIST CVX/VAG CYTO: NORMAL
CYTOLOGY CVX/VAG DOC CYTO: NORMAL
CYTOLOGY CVX/VAG DOC THIN PREP: NORMAL
HPV I/H RISK 4 DNA CVX QL PROBE+SIG AMP: NEGATIVE
N GONORRHOEA RRNA CVX QL NAA+PROBE: NEGATIVE
NOTE NL11727A: NORMAL
OTHER STN SPEC: NORMAL
QC REVIEWED BY NL11722A: NORMAL
STAT OF ADQ CVX/VAG CYTO-IMP: NORMAL

## 2023-08-01 ENCOUNTER — HOSPITAL ENCOUNTER (OUTPATIENT)
Dept: LAB | Facility: MEDICAL CENTER | Age: 28
End: 2023-08-01
Payer: MEDICAID

## 2023-08-01 DIAGNOSIS — O09.92 SUPERVISION OF HIGH RISK PREGNANCY IN SECOND TRIMESTER: ICD-10-CM

## 2023-08-01 LAB
ABO GROUP BLD: NORMAL
BLD GP AB SCN SERPL QL: NORMAL
ERYTHROCYTE [DISTWIDTH] IN BLOOD BY AUTOMATED COUNT: 40.3 FL (ref 35.9–50)
HBV SURFACE AG SER QL: ABNORMAL
HCT VFR BLD AUTO: 35.8 % (ref 37–47)
HCV AB SER QL: ABNORMAL
HGB BLD-MCNC: 11.5 G/DL (ref 12–16)
HIV 1+2 AB+HIV1 P24 AG SERPL QL IA: NORMAL
MCH RBC QN AUTO: 27.1 PG (ref 27–33)
MCHC RBC AUTO-ENTMCNC: 32.1 G/DL (ref 32.2–35.5)
MCV RBC AUTO: 84.4 FL (ref 81.4–97.8)
PLATELET # BLD AUTO: 341 K/UL (ref 164–446)
PMV BLD AUTO: 9.3 FL (ref 9–12.9)
RBC # BLD AUTO: 4.24 M/UL (ref 4.2–5.4)
RH BLD: NORMAL
RUBV AB SER QL: 59.1 IU/ML
T PALLIDUM AB SER QL IA: ABNORMAL
WBC # BLD AUTO: 8.1 K/UL (ref 4.8–10.8)

## 2023-08-01 PROCEDURE — 86900 BLOOD TYPING SEROLOGIC ABO: CPT

## 2023-08-01 PROCEDURE — 86762 RUBELLA ANTIBODY: CPT

## 2023-08-01 PROCEDURE — 87389 HIV-1 AG W/HIV-1&-2 AB AG IA: CPT

## 2023-08-01 PROCEDURE — 86803 HEPATITIS C AB TEST: CPT

## 2023-08-01 PROCEDURE — 87086 URINE CULTURE/COLONY COUNT: CPT

## 2023-08-01 PROCEDURE — 87077 CULTURE AEROBIC IDENTIFY: CPT

## 2023-08-01 PROCEDURE — 85027 COMPLETE CBC AUTOMATED: CPT

## 2023-08-01 PROCEDURE — 86850 RBC ANTIBODY SCREEN: CPT

## 2023-08-01 PROCEDURE — 87340 HEPATITIS B SURFACE AG IA: CPT

## 2023-08-01 PROCEDURE — 86901 BLOOD TYPING SEROLOGIC RH(D): CPT

## 2023-08-01 PROCEDURE — 36415 COLL VENOUS BLD VENIPUNCTURE: CPT

## 2023-08-01 PROCEDURE — 87186 SC STD MICRODIL/AGAR DIL: CPT

## 2023-08-01 PROCEDURE — 81511 FTL CGEN ABNOR FOUR ANAL: CPT

## 2023-08-01 PROCEDURE — 86780 TREPONEMA PALLIDUM: CPT

## 2023-08-01 PROCEDURE — 80307 DRUG TEST PRSMV CHEM ANLYZR: CPT

## 2023-08-02 ENCOUNTER — TELEPHONE (OUTPATIENT)
Dept: OBGYN | Facility: CLINIC | Age: 28
End: 2023-08-02
Payer: MEDICAID

## 2023-08-02 DIAGNOSIS — R82.71 ASB (ASYMPTOMATIC BACTERIURIA): ICD-10-CM

## 2023-08-02 PROCEDURE — RXMED WILLOW AMBULATORY MEDICATION CHARGE

## 2023-08-02 RX ORDER — NITROFURANTOIN 25; 75 MG/1; MG/1
100 CAPSULE ORAL 2 TIMES DAILY
Qty: 14 CAPSULE | Refills: 0 | Status: SHIPPED | OUTPATIENT
Start: 2023-08-02 | End: 2023-08-10

## 2023-08-02 RX ORDER — METRONIDAZOLE 500 MG/1
500 TABLET ORAL 2 TIMES DAILY
Qty: 14 TABLET | Refills: 0 | Status: SHIPPED | OUTPATIENT
Start: 2023-08-02 | End: 2023-08-22

## 2023-08-03 ENCOUNTER — PHARMACY VISIT (OUTPATIENT)
Dept: PHARMACY | Facility: MEDICAL CENTER | Age: 28
End: 2023-08-03
Payer: COMMERCIAL

## 2023-08-03 LAB
AMPHET CTO UR CFM-MCNC: NEGATIVE NG/ML
BACTERIA UR CULT: ABNORMAL
BARBITURATES CTO UR CFM-MCNC: NEGATIVE NG/ML
BENZODIAZ CTO UR CFM-MCNC: NEGATIVE NG/ML
CANNABINOIDS CTO UR CFM-MCNC: NEGATIVE NG/ML
COCAINE CTO UR CFM-MCNC: NEGATIVE NG/ML
DRUG COMMENT 753798: NORMAL
METHADONE CTO UR CFM-MCNC: NEGATIVE NG/ML
OPIATES CTO UR CFM-MCNC: NEGATIVE NG/ML
PCP CTO UR CFM-MCNC: NEGATIVE NG/ML
PROPOXYPH CTO UR CFM-MCNC: NEGATIVE NG/ML
SIGNIFICANT IND 70042: ABNORMAL
SITE SITE: ABNORMAL
SOURCE SOURCE: ABNORMAL

## 2023-08-03 NOTE — TELEPHONE ENCOUNTER
----- Message from Savita Spencer C.N.M. sent at 8/2/2023  7:44 AM PDT -----  Normal pap, +BV, please have her treat if she has symptoms (see message to patient)    Savita Spencer C.N.M.   8/2/2023 12:10 PM PDT       Patient with UTI on urine culture. I have prescribed macrobid 100mg BID x7 days but will let the patient know if the sensitivity indicates a different antibiotic may be needed.       Pt notified of UTI and needs to start on antibiotics, instructions given. Advised pt to increase water intake to minimum of 4 lt of water a day. Rx sent by provider. Pharmacy verified with pt. Pt agreed and verbalized understanding.     Pt informed of pap results and +BV. Read to ptjosette message sent by Savita regarding this. Pt verbalized understanding.

## 2023-08-04 ENCOUNTER — TELEPHONE (OUTPATIENT)
Dept: OBGYN | Facility: CLINIC | Age: 28
End: 2023-08-04

## 2023-08-04 LAB
# FETUSES US: NORMAL
AFP MOM SERPL: 1.28
AFP SERPL-MCNC: 106 NG/ML
AGE - REPORTED: 30 YR
CURRENT SMOKER: NO
FAMILY MEMBER DISEASES HX: NO
GA METHOD: NORMAL
GA: NORMAL WK
HCG MOM SERPL: 1.34
HCG SERPL-ACNC: NORMAL IU/L
HX OF HEREDITARY DISORDERS: NO
IDDM PATIENT QL: NO
INHIBIN A MOM SERPL: 0.83
INHIBIN A SERPL-MCNC: 164 PG/ML
INTEGRATED SCN PATIENT-IMP: NORMAL
PATHOLOGY STUDY: NORMAL
SPECIMEN DRAWN SERPL: NORMAL
U ESTRIOL MOM SERPL: 1.84
U ESTRIOL SERPL-MCNC: 5.58 NG/ML

## 2023-08-04 NOTE — TELEPHONE ENCOUNTER
Someone from the [facility] called in to let us know the patient's result to their urine culture and that she tested positive. Her urine culture was greater than 100 thousand.

## 2023-08-22 ENCOUNTER — ROUTINE PRENATAL (OUTPATIENT)
Dept: OBGYN | Facility: CLINIC | Age: 28
End: 2023-08-22
Payer: MEDICAID

## 2023-08-22 ENCOUNTER — HOSPITAL ENCOUNTER (OUTPATIENT)
Facility: MEDICAL CENTER | Age: 28
End: 2023-08-22
Attending: NURSE PRACTITIONER
Payer: MEDICAID

## 2023-08-22 VITALS — SYSTOLIC BLOOD PRESSURE: 92 MMHG | BODY MASS INDEX: 27.15 KG/M2 | DIASTOLIC BLOOD PRESSURE: 62 MMHG | WEIGHT: 139 LBS

## 2023-08-22 DIAGNOSIS — Z34.80 SUPERVISION OF OTHER NORMAL PREGNANCY, ANTEPARTUM: ICD-10-CM

## 2023-08-22 DIAGNOSIS — Z87.42 HISTORY OF ABNORMAL CERVICAL PAP SMEAR: ICD-10-CM

## 2023-08-22 PROCEDURE — 3078F DIAST BP <80 MM HG: CPT | Performed by: NURSE PRACTITIONER

## 2023-08-22 PROCEDURE — 0502F SUBSEQUENT PRENATAL CARE: CPT | Performed by: NURSE PRACTITIONER

## 2023-08-22 PROCEDURE — 87077 CULTURE AEROBIC IDENTIFY: CPT

## 2023-08-22 PROCEDURE — 87186 SC STD MICRODIL/AGAR DIL: CPT

## 2023-08-22 PROCEDURE — 87086 URINE CULTURE/COLONY COUNT: CPT

## 2023-08-22 PROCEDURE — 3074F SYST BP LT 130 MM HG: CPT | Performed by: NURSE PRACTITIONER

## 2023-08-22 NOTE — LETTER
August 22, 2023      Rosana Rick is currently pregnant and being cared for by [facility]. She was here today for her appt.         Thank you,          KEYONNA David.    Electronically Signed

## 2023-08-22 NOTE — PROGRESS NOTES
SUBJECTIVE:  Pt is a 27 y.o.   at 24w4d  gestation. Presents today for follow-up prenatal care. Reports  fetal movement. Denies regular cramping/contractions, bleeding or leaking of fluid. Denies dysuria, headaches, N/V. Generally feels well today. She took treatment for BV and UTI.     OBJECTIVE:  - See prenatal vitals flow  -   Vitals:    23 1447   BP: 92/62   Weight: 139 lb                 ASSESSMENT:   - IUP at 24w4d    - S=D   -   Patient Active Problem List    Diagnosis Date Noted    Supervision of other normal pregnancy, antepartum 2023    ASB (asymptomatic bacteriuria) 2023    History of methamphetamine use 2023    ASCUS with positive high risk HPV cervical 2021         PLAN:  - S/sx pregnancy and labor warning signs vs general discomforts discussed  - Fetal movements and/or kick counts reviewed   - Adequate hydration reinforced  - Nutrition/exercise/vitamin education; continue PNV  - Urine culture GURMEET ordered  - Will do GCT in a few weeks   - Anticipatory guidance given  - RTC in 4 weeks for follow-up prenatal care

## 2023-08-22 NOTE — PROGRESS NOTES
Pt. Here for OB/FU.   Reports Good FM.   Pt. Denies VB, LOF, or UC's.   Chaperone offered and indicated.   Labs done 8/1.   Finished metronidazole sometime last week and no longer experiencing symptoms.   Pt states she has no concerns.

## 2023-08-24 DIAGNOSIS — R82.71 ASB (ASYMPTOMATIC BACTERIURIA): ICD-10-CM

## 2023-08-24 RX ORDER — AMPICILLIN 500 MG/1
500 CAPSULE ORAL 4 TIMES DAILY
Qty: 28 CAPSULE | Refills: 0 | Status: SHIPPED | OUTPATIENT
Start: 2023-08-25 | End: 2023-09-03

## 2023-08-25 ENCOUNTER — TELEPHONE (OUTPATIENT)
Dept: OBGYN | Facility: CLINIC | Age: 28
End: 2023-08-25

## 2023-08-25 ENCOUNTER — TELEPHONE (OUTPATIENT)
Dept: OBGYN | Facility: CLINIC | Age: 28
End: 2023-08-25
Payer: MEDICAID

## 2023-08-25 PROCEDURE — RXMED WILLOW AMBULATORY MEDICATION CHARGE: Performed by: NURSE PRACTITIONER

## 2023-08-25 NOTE — TELEPHONE ENCOUNTER
----- Message from JANUARY David sent at 8/24/2023 10:11 PM PDT -----  Pt needs treatment again for UTI, different rx sent in. Increase water intake and take full course of meds.     8/25/23 1005  Bad connection x2, pt unable to hear this RN. Will try later.

## 2023-08-25 NOTE — TELEPHONE ENCOUNTER
"\"Pt needs treatment again for UTI, different rx sent in. Increase water intake and take full course of meds.\" Per Kate CNSHI     Pt informed of above and understood  "

## 2023-08-27 ENCOUNTER — PHARMACY VISIT (OUTPATIENT)
Dept: PHARMACY | Facility: MEDICAL CENTER | Age: 28
End: 2023-08-27
Payer: COMMERCIAL

## 2023-08-28 NOTE — TELEPHONE ENCOUNTER
"Elise Fajardo, Med Ass't       8/25/23 11:17 AM  Note     \"Pt needs treatment again for UTI, different rx sent in. Increase water intake and take full course of meds.\" Per Kate CNM      Pt informed of above and understood        "

## 2023-09-22 ENCOUNTER — HOSPITAL ENCOUNTER (OUTPATIENT)
Dept: LAB | Facility: MEDICAL CENTER | Age: 28
End: 2023-09-22
Attending: NURSE PRACTITIONER
Payer: MEDICAID

## 2023-09-22 ENCOUNTER — APPOINTMENT (OUTPATIENT)
Dept: OBGYN | Facility: CLINIC | Age: 28
End: 2023-09-22
Payer: MEDICAID

## 2023-09-22 DIAGNOSIS — Z34.80 SUPERVISION OF OTHER NORMAL PREGNANCY, ANTEPARTUM: ICD-10-CM

## 2023-09-22 LAB
ERYTHROCYTE [DISTWIDTH] IN BLOOD BY AUTOMATED COUNT: 40.1 FL (ref 35.9–50)
GLUCOSE 1H P 50 G GLC PO SERPL-MCNC: 132 MG/DL (ref 70–139)
HCT VFR BLD AUTO: 35.1 % (ref 37–47)
HGB BLD-MCNC: 10.6 G/DL (ref 12–16)
MCH RBC QN AUTO: 24.8 PG (ref 27–33)
MCHC RBC AUTO-ENTMCNC: 30.2 G/DL (ref 32.2–35.5)
MCV RBC AUTO: 82 FL (ref 81.4–97.8)
PLATELET # BLD AUTO: 323 K/UL (ref 164–446)
PMV BLD AUTO: 9.4 FL (ref 9–12.9)
RBC # BLD AUTO: 4.28 M/UL (ref 4.2–5.4)
T PALLIDUM AB SER QL IA: NORMAL
WBC # BLD AUTO: 8.1 K/UL (ref 4.8–10.8)

## 2023-09-22 PROCEDURE — 85027 COMPLETE CBC AUTOMATED: CPT

## 2023-09-22 PROCEDURE — 86780 TREPONEMA PALLIDUM: CPT

## 2023-09-22 PROCEDURE — 36415 COLL VENOUS BLD VENIPUNCTURE: CPT

## 2023-09-22 PROCEDURE — 82950 GLUCOSE TEST: CPT

## 2023-09-25 ENCOUNTER — TELEPHONE (OUTPATIENT)
Dept: OBGYN | Facility: CLINIC | Age: 28
End: 2023-09-25
Payer: MEDICAID

## 2023-09-25 NOTE — TELEPHONE ENCOUNTER
----- Message from JANUARY David sent at 9/25/2023 11:11 AM PDT -----  Pt can take iron once a day and try to increase her iron intake with her iron rich foods.     9/25/2023 1142  Pt notified of need to start on Iron supplementation to take 325 mg daily.  Recommended to take it with orange juice/vit c, to avoid dairy products 1hr before and 2hr after. Not to take with PNV. To increase water intake to decrease side effects of constipation. Informed that she  should also try to eat iron rich foods like, red meat, pork, poultry, dark leafy vegetables and beans. All other labs WNL. Pt agreed and verbalized understanding.

## 2023-10-03 ENCOUNTER — ROUTINE PRENATAL (OUTPATIENT)
Dept: OBGYN | Facility: CLINIC | Age: 28
End: 2023-10-03
Payer: MEDICAID

## 2023-10-03 VITALS — WEIGHT: 144 LBS | BODY MASS INDEX: 28.12 KG/M2 | DIASTOLIC BLOOD PRESSURE: 60 MMHG | SYSTOLIC BLOOD PRESSURE: 90 MMHG

## 2023-10-03 DIAGNOSIS — Z23 ENCOUNTER FOR IMMUNIZATION: ICD-10-CM

## 2023-10-03 DIAGNOSIS — R82.71 ASB (ASYMPTOMATIC BACTERIURIA): ICD-10-CM

## 2023-10-03 PROCEDURE — 90471 IMMUNIZATION ADMIN: CPT | Performed by: STUDENT IN AN ORGANIZED HEALTH CARE EDUCATION/TRAINING PROGRAM

## 2023-10-03 PROCEDURE — 90715 TDAP VACCINE 7 YRS/> IM: CPT | Performed by: STUDENT IN AN ORGANIZED HEALTH CARE EDUCATION/TRAINING PROGRAM

## 2023-10-03 PROCEDURE — 3078F DIAST BP <80 MM HG: CPT | Performed by: STUDENT IN AN ORGANIZED HEALTH CARE EDUCATION/TRAINING PROGRAM

## 2023-10-03 PROCEDURE — 0502F SUBSEQUENT PRENATAL CARE: CPT | Performed by: STUDENT IN AN ORGANIZED HEALTH CARE EDUCATION/TRAINING PROGRAM

## 2023-10-03 PROCEDURE — 3074F SYST BP LT 130 MM HG: CPT | Performed by: STUDENT IN AN ORGANIZED HEALTH CARE EDUCATION/TRAINING PROGRAM

## 2023-10-03 NOTE — LETTER
"Count Your Baby's Movements  Another step to a healthy delivery    A Epic Dress Re Test             Dept: [phone]    How Many Weeks Pregnant? Unknown    Date to Begin Counting: 10/3/23              How to use this chart    One way for your physician to keep track of your baby's health is by knowing how often the baby moves (or \"kicks\") in your womb.  You can help your physician to do this by using this chart every day.    Every day, you should see how many hours it takes for your baby to move 10 times.  Start in the morning, as soon as you get up.    First, write down the time your baby moves until you get to 10.  Check off one box every time your baby moves until you get to 10.  Write down the time you finished counting in the last column.  Total how long it took to count up all 10 movements.  Finally, fill in the box that shows how long this took.  After counting 10 movements, you no longer have to count any more that day.  The next morning, just start counting again as soon as you get up.    What should you call a \"movement\"?  It is hard to say, because it will feel different from one mother to another and from one pregnancy to the next.  The important thing is that you count the movements the same way throughout your pregnancy.  If you have more questions, you should ask your physician.    Count carefully every day!  SAMPLE:  Week 28    How many hours did it take to feel 10 movements?       Start  Time     1     2     3     4     5     6     7     8     9     10   Finish Time   Mon 8:20           11:40   Tue Wed Thu               Fri               Sat               Sun                 IMPORTANT: You should contact your physician if it takes more than two hours for you to feel 10 movements.  Each morning, write down the time and start to count the movements of your baby.  Keep track by checking off one box every time you feel one movement.  When you have felt 10 \"kicks\", write " down the time you finished counting in the last column.  Then fill in the   box (over the check viji) for the number of hours it took.  Be sure to read the complete instructions on the previous page.

## 2023-10-03 NOTE — PROGRESS NOTES
OB follow up   + fetal movement.  No VB, LOF or UC's.  Phone # [phone] (home) [phone] (work)  Preferred pharmacy confirmed.  Tdap - GIVEN   ESPERANZA given today   BTL signed today

## 2023-10-03 NOTE — PROGRESS NOTES
Return OB Visit    SUBJECTIVE:   Pt is a 26 y/o  at 30w4d gestation by 19 week U/S who presents for routine OB follow up.   Reports good fetal movement.  Denies contractions, vaginal bleeding, or leakage of fluid.    Concerns: heartburn, difficulty sleeping  Questions answered.    Pregnancy complications:  Asymptomatic bacteriuria, s/p abx and needs f/u urine culture  H/o methamphetamine use    O: BP 90/60   Wt 144 lb   LMP 2023   BMI 28.12 kg/m²   Fundal Height: 30 cm  FHR: 148      Lab:  Recent Results (from the past 336 hour(s))   CBC WITHOUT DIFFERENTIAL    Collection Time: 23  8:53 AM   Result Value Ref Range    WBC 8.1 4.8 - 10.8 K/uL    RBC 4.28 4.20 - 5.40 M/uL    Hemoglobin 10.6 (L) 12.0 - 16.0 g/dL    Hematocrit 35.1 (L) 37.0 - 47.0 %    MCV 82.0 81.4 - 97.8 fL    MCH 24.8 (L) 27.0 - 33.0 pg    MCHC 30.2 (L) 32.2 - 35.5 g/dL    RDW 40.1 35.9 - 50.0 fL    Platelet Count 323 164 - 446 K/uL    MPV 9.4 9.0 - 12.9 fL   T.PALLIDUM AB JUNIOR (SCREENING)    Collection Time: 23  8:53 AM   Result Value Ref Range    Syphilis, Treponemal Qual Non-Reactive Non-Reactive   GLUCOSE 1HR GESTATIONAL    Collection Time: 23  8:53 AM   Result Value Ref Range    Glucose, Post Dose 132 70 - 139 mg/dL       A/P:  27 y.o.  at 30w4d presents for routine obstetric follow-up.   Pregnancy complicated by h/o substance use and asymptomatic bacteriuria- urine culture ordered today  Size equals dates  FHR and maternal BP reassuring  Patients' weight gain, fluid intake and exercise level discussed.    1.  Continue prenatal vitamins.  2.  Regular physical activity  3.  Drink at least 2L of water daily  4.  Labor precautions Discussed  5.  Follow-up in 2 weeks or PRN.    Jackie Dominguez MD

## 2023-10-13 ENCOUNTER — HOSPITAL ENCOUNTER (OUTPATIENT)
Dept: LAB | Facility: MEDICAL CENTER | Age: 28
End: 2023-10-13
Attending: STUDENT IN AN ORGANIZED HEALTH CARE EDUCATION/TRAINING PROGRAM
Payer: MEDICAID

## 2023-10-13 PROCEDURE — 87086 URINE CULTURE/COLONY COUNT: CPT

## 2023-10-15 LAB
BACTERIA UR CULT: NORMAL
SIGNIFICANT IND 70042: NORMAL
SITE SITE: NORMAL
SOURCE SOURCE: NORMAL

## 2023-10-20 ENCOUNTER — ROUTINE PRENATAL (OUTPATIENT)
Dept: OBGYN | Facility: CLINIC | Age: 28
End: 2023-10-20
Payer: MEDICAID

## 2023-10-20 VITALS — SYSTOLIC BLOOD PRESSURE: 90 MMHG | DIASTOLIC BLOOD PRESSURE: 62 MMHG | BODY MASS INDEX: 28.12 KG/M2 | WEIGHT: 144 LBS

## 2023-10-20 DIAGNOSIS — Z3A.33 PREGNANCY WITH 33 COMPLETED WEEKS GESTATION: ICD-10-CM

## 2023-10-20 DIAGNOSIS — Z34.80 SUPERVISION OF OTHER NORMAL PREGNANCY, ANTEPARTUM: ICD-10-CM

## 2023-10-20 PROCEDURE — 99999 PR NO CHARGE: CPT | Performed by: OBSTETRICS & GYNECOLOGY

## 2023-10-20 NOTE — PROGRESS NOTES
Chief complaint: Return OB visit    S: Pt presents for routine OB follow up. Good fetal movement.  No contractions, vaginal bleeding, or leakage of fluid.    Questions answered.    O: BP 90/62   Wt 144 lb   LMP 2023   BMI 28.12 kg/m²   Patients' weight gain, fluid intake and exercise level discussed.  Vitals, fundal height , fetal position, and FHR reviewed on flowsheet    Lab:  Recent Results (from the past 336 hour(s))   URINE CULTURE(NEW)    Collection Time: 10/13/23  3:35 PM    Specimen: Urine   Result Value Ref Range    Significant Indicator NEG     Source UR     Site -     Culture Result Usual urogenital kerry 10-50,000 cfu/mL        A/P:  27 y.o.  at 33w0d presents for routine obstetric follow-up.  Size equals dates and/or scan    1.  Continue prenatal vitamins.  2.  Fetal kick counts.  3.  Exercise at least 30 minutes daily.  4.  Drink at least 2L of water daily  5.  Labor precautions educated.  6.  Follow-up in 2 weeks.  7.  GBS 35 to 37 weeks    Fundal height 33 cm    All questions answered

## 2023-10-20 NOTE — PROGRESS NOTES
OB follow up   + fetal movement.  No  LOF or UC's.  Phone # [phone] (home) [phone] (work)  Preferred pharmacy confirmed.  Flu vaccine offered and declined     Pt states VB this morning, jut a little bit.

## 2023-11-03 ENCOUNTER — ROUTINE PRENATAL (OUTPATIENT)
Dept: OBGYN | Facility: CLINIC | Age: 28
End: 2023-11-03
Payer: MEDICAID

## 2023-11-03 VITALS — BODY MASS INDEX: 28.32 KG/M2 | SYSTOLIC BLOOD PRESSURE: 90 MMHG | WEIGHT: 145 LBS | DIASTOLIC BLOOD PRESSURE: 60 MMHG

## 2023-11-03 DIAGNOSIS — Z34.80 SUPERVISION OF OTHER NORMAL PREGNANCY, ANTEPARTUM: ICD-10-CM

## 2023-11-03 PROCEDURE — 3074F SYST BP LT 130 MM HG: CPT | Performed by: OBSTETRICS & GYNECOLOGY

## 2023-11-03 PROCEDURE — 0502F SUBSEQUENT PRENATAL CARE: CPT | Performed by: OBSTETRICS & GYNECOLOGY

## 2023-11-03 PROCEDURE — 3078F DIAST BP <80 MM HG: CPT | Performed by: OBSTETRICS & GYNECOLOGY

## 2023-11-09 ENCOUNTER — ROUTINE PRENATAL (OUTPATIENT)
Dept: OBGYN | Facility: CLINIC | Age: 28
End: 2023-11-09
Payer: MEDICAID

## 2023-11-09 VITALS — BODY MASS INDEX: 28.51 KG/M2 | SYSTOLIC BLOOD PRESSURE: 101 MMHG | WEIGHT: 146 LBS | DIASTOLIC BLOOD PRESSURE: 58 MMHG

## 2023-11-09 DIAGNOSIS — Z34.83 PRENATAL CARE, SUBSEQUENT PREGNANCY IN THIRD TRIMESTER: ICD-10-CM

## 2023-11-09 PROCEDURE — 3078F DIAST BP <80 MM HG: CPT | Performed by: OBSTETRICS & GYNECOLOGY

## 2023-11-09 PROCEDURE — 0502F SUBSEQUENT PRENATAL CARE: CPT | Performed by: OBSTETRICS & GYNECOLOGY

## 2023-11-09 PROCEDURE — 3074F SYST BP LT 130 MM HG: CPT | Performed by: OBSTETRICS & GYNECOLOGY

## 2023-11-09 NOTE — PROGRESS NOTES
OB Followup;    35w6d    Patient Active Problem List    Diagnosis Date Noted    Pregnancy with 33 completed weeks gestation 10/20/2023    Supervision of other normal pregnancy, antepartum 2023    History of abnormal cervical Pap smear 2023    ASB (asymptomatic bacteriuria) 2023    History of methamphetamine use 2023       Vitals:    23 1037   BP: 101/58   Weight: 146 lb       Patient presents for followup of OB care. Currently doing well . Good fetal movement no leakage of fluid no contractions or vaginal bleeding        Size equals dates, normal fetal heart rate      Labs; GBS culture next visit    Labor precautions given  Discussed proper weight gain during pregnancy.    Signs and symptoms of labor/ labor discussed   Discussed proper exercise during pregnancy  Discussed proper oral fluid hydration  Reviewed fetal kick counts and appropriate fetal movement during pregnancy  Reviewed postpartum birth control methods  All questions answered in detail    Followup in  1 weeks

## 2023-11-21 ENCOUNTER — HOSPITAL ENCOUNTER (OUTPATIENT)
Facility: MEDICAL CENTER | Age: 28
End: 2023-11-21
Attending: STUDENT IN AN ORGANIZED HEALTH CARE EDUCATION/TRAINING PROGRAM
Payer: MEDICAID

## 2023-11-21 ENCOUNTER — ROUTINE PRENATAL (OUTPATIENT)
Dept: OBGYN | Facility: CLINIC | Age: 28
End: 2023-11-21
Payer: MEDICAID

## 2023-11-21 VITALS — DIASTOLIC BLOOD PRESSURE: 60 MMHG | WEIGHT: 148 LBS | BODY MASS INDEX: 28.9 KG/M2 | SYSTOLIC BLOOD PRESSURE: 100 MMHG

## 2023-11-21 DIAGNOSIS — Z34.80 SUPERVISION OF OTHER NORMAL PREGNANCY, ANTEPARTUM: ICD-10-CM

## 2023-11-21 DIAGNOSIS — R82.71 ASB (ASYMPTOMATIC BACTERIURIA): ICD-10-CM

## 2023-11-21 PROCEDURE — 0502F SUBSEQUENT PRENATAL CARE: CPT | Performed by: STUDENT IN AN ORGANIZED HEALTH CARE EDUCATION/TRAINING PROGRAM

## 2023-11-21 PROCEDURE — 87150 DNA/RNA AMPLIFIED PROBE: CPT

## 2023-11-21 PROCEDURE — 87081 CULTURE SCREEN ONLY: CPT

## 2023-11-21 PROCEDURE — 3078F DIAST BP <80 MM HG: CPT | Performed by: STUDENT IN AN ORGANIZED HEALTH CARE EDUCATION/TRAINING PROGRAM

## 2023-11-21 PROCEDURE — 3074F SYST BP LT 130 MM HG: CPT | Performed by: STUDENT IN AN ORGANIZED HEALTH CARE EDUCATION/TRAINING PROGRAM

## 2023-11-21 NOTE — PROGRESS NOTES
[facility] Return OB Visit    SUBJECTIVE:   Pt presents for routine OB follow up.   Reports good fetal movement.  Denies contractions, vaginal bleeding, or leakage of fluid.    Concerns: No concerns.   Questions answered.    Pregnancy complications:  Late prenatal care, history of maternal amphetamine use, asymptomatic bacteriuria.     O: LMP 2023   Fundal Height: 37cm  FHR: 145  Cervical exam performed by Dr. Dominguez: Closed/70/-2.       Lab:No results found for this or any previous visit (from the past 336 hour(s)).    A/P:  27 y.o.  at 37w4d presents for routine obstetric follow-up.   Pregnancy complicated by late prenatal care, history of maternal amphetamine use, asymptomatic bacteriuria.   Size equals dates  FHR and maternal BP reassuring  Patients' weight gain, fluid intake and exercise level discussed.    1.  Continue prenatal vitamins.  2.  Regular physical activity  3.  Drink at least 2L of water daily  4.  Labor precautions Discussed  5.  Follow-up in 1 week.  6.  GBS collected today   7.  Patient desires elective induction of labor at 39 weeks.

## 2023-11-21 NOTE — PROGRESS NOTES
OB follow up   + fetal movement.  No VB, LOF or UC's.  Phone # [phone] (home) [phone] (work)  Preferred pharmacy confirmed.  GBS- today   FLU declined

## 2023-11-23 LAB — GP B STREP DNA SPEC QL NAA+PROBE: NEGATIVE

## 2023-12-03 ENCOUNTER — APPOINTMENT (OUTPATIENT)
Dept: OBGYN | Facility: MEDICAL CENTER | Age: 28
End: 2023-12-03
Attending: OBSTETRICS & GYNECOLOGY
Payer: MEDICAID

## 2023-12-03 ENCOUNTER — HOSPITAL ENCOUNTER (INPATIENT)
Facility: MEDICAL CENTER | Age: 28
LOS: 1 days | End: 2023-12-04
Attending: OBSTETRICS & GYNECOLOGY | Admitting: OBSTETRICS & GYNECOLOGY
Payer: MEDICAID

## 2023-12-03 LAB
BASOPHILS # BLD AUTO: 0.3 % (ref 0–1.8)
BASOPHILS # BLD: 0.02 K/UL (ref 0–0.12)
EOSINOPHIL # BLD AUTO: 0.12 K/UL (ref 0–0.51)
EOSINOPHIL NFR BLD: 1.9 % (ref 0–6.9)
ERYTHROCYTE [DISTWIDTH] IN BLOOD BY AUTOMATED COUNT: 39.7 FL (ref 35.9–50)
HCT VFR BLD AUTO: 37.7 % (ref 37–47)
HGB BLD-MCNC: 11.6 G/DL (ref 12–16)
HOLDING TUBE BB 8507: NORMAL
IMM GRANULOCYTES # BLD AUTO: 0.03 K/UL (ref 0–0.11)
IMM GRANULOCYTES NFR BLD AUTO: 0.5 % (ref 0–0.9)
LYMPHOCYTES # BLD AUTO: 1.58 K/UL (ref 1–4.8)
LYMPHOCYTES NFR BLD: 25.6 % (ref 22–41)
MCH RBC QN AUTO: 22.4 PG (ref 27–33)
MCHC RBC AUTO-ENTMCNC: 30.8 G/DL (ref 32.2–35.5)
MCV RBC AUTO: 72.9 FL (ref 81.4–97.8)
MONOCYTES # BLD AUTO: 0.38 K/UL (ref 0–0.85)
MONOCYTES NFR BLD AUTO: 6.2 % (ref 0–13.4)
NEUTROPHILS # BLD AUTO: 4.04 K/UL (ref 1.82–7.42)
NEUTROPHILS NFR BLD: 65.5 % (ref 44–72)
NRBC # BLD AUTO: 0 K/UL
NRBC BLD-RTO: 0 /100 WBC (ref 0–0.2)
PLATELET # BLD AUTO: 280 K/UL (ref 164–446)
PMV BLD AUTO: 10.7 FL (ref 9–12.9)
RBC # BLD AUTO: 5.17 M/UL (ref 4.2–5.4)
T PALLIDUM AB SER QL IA: NORMAL
WBC # BLD AUTO: 6.2 K/UL (ref 4.8–10.8)

## 2023-12-03 PROCEDURE — 304965 HCHG RECOVERY SERVICES

## 2023-12-03 PROCEDURE — 302449 STATCHG TRIAGE ONLY (STATISTIC)

## 2023-12-03 PROCEDURE — 770002 HCHG ROOM/CARE - OB PRIVATE (112)

## 2023-12-03 PROCEDURE — A9270 NON-COVERED ITEM OR SERVICE: HCPCS | Performed by: ADVANCED PRACTICE MIDWIFE

## 2023-12-03 PROCEDURE — 700102 HCHG RX REV CODE 250 W/ 637 OVERRIDE(OP): Performed by: ADVANCED PRACTICE MIDWIFE

## 2023-12-03 PROCEDURE — 86780 TREPONEMA PALLIDUM: CPT

## 2023-12-03 PROCEDURE — 85025 COMPLETE CBC W/AUTO DIFF WBC: CPT

## 2023-12-03 PROCEDURE — 10907ZC DRAINAGE OF AMNIOTIC FLUID, THERAPEUTIC FROM PRODUCTS OF CONCEPTION, VIA NATURAL OR ARTIFICIAL OPENING: ICD-10-PCS | Performed by: ADVANCED PRACTICE MIDWIFE

## 2023-12-03 PROCEDURE — 700111 HCHG RX REV CODE 636 W/ 250 OVERRIDE (IP): Mod: JZ | Performed by: ADVANCED PRACTICE MIDWIFE

## 2023-12-03 PROCEDURE — 59409 OBSTETRICAL CARE: CPT

## 2023-12-03 PROCEDURE — 59410 OBSTETRICAL CARE: CPT | Performed by: ADVANCED PRACTICE MIDWIFE

## 2023-12-03 PROCEDURE — 36415 COLL VENOUS BLD VENIPUNCTURE: CPT

## 2023-12-03 RX ORDER — IBUPROFEN 800 MG/1
800 TABLET ORAL
Status: COMPLETED | OUTPATIENT
Start: 2023-12-03 | End: 2023-12-03

## 2023-12-03 RX ORDER — ACETAMINOPHEN 500 MG
1000 TABLET ORAL
Status: DISCONTINUED | OUTPATIENT
Start: 2023-12-03 | End: 2023-12-03 | Stop reason: HOSPADM

## 2023-12-03 RX ORDER — MISOPROSTOL 200 UG/1
600 TABLET ORAL
Status: DISCONTINUED | OUTPATIENT
Start: 2023-12-03 | End: 2023-12-04 | Stop reason: HOSPADM

## 2023-12-03 RX ORDER — ONDANSETRON 2 MG/ML
4 INJECTION INTRAMUSCULAR; INTRAVENOUS EVERY 6 HOURS PRN
Status: DISCONTINUED | OUTPATIENT
Start: 2023-12-03 | End: 2023-12-03 | Stop reason: HOSPADM

## 2023-12-03 RX ORDER — SODIUM CHLORIDE, SODIUM LACTATE, POTASSIUM CHLORIDE, CALCIUM CHLORIDE 600; 310; 30; 20 MG/100ML; MG/100ML; MG/100ML; MG/100ML
INJECTION, SOLUTION INTRAVENOUS CONTINUOUS
Status: DISCONTINUED | OUTPATIENT
Start: 2023-12-03 | End: 2023-12-04 | Stop reason: HOSPADM

## 2023-12-03 RX ORDER — ACETAMINOPHEN 500 MG
1000 TABLET ORAL EVERY 6 HOURS PRN
Status: DISCONTINUED | OUTPATIENT
Start: 2023-12-03 | End: 2023-12-04 | Stop reason: HOSPADM

## 2023-12-03 RX ORDER — OXYTOCIN 10 [USP'U]/ML
10 INJECTION, SOLUTION INTRAMUSCULAR; INTRAVENOUS
Status: DISCONTINUED | OUTPATIENT
Start: 2023-12-03 | End: 2023-12-03 | Stop reason: HOSPADM

## 2023-12-03 RX ORDER — VITAMIN A ACETATE, BETA CAROTENE, ASCORBIC ACID, CHOLECALCIFEROL, .ALPHA.-TOCOPHEROL ACETATE, DL-, THIAMINE MONONITRATE, RIBOFLAVIN, NIACINAMIDE, PYRIDOXINE HYDROCHLORIDE, FOLIC ACID, CYANOCOBALAMIN, CALCIUM CARBONATE, FERROUS FUMARATE, ZINC OXIDE, CUPRIC OXIDE 3080; 12; 120; 400; 1; 1.84; 3; 20; 22; 920; 25; 200; 27; 10; 2 [IU]/1; UG/1; MG/1; [IU]/1; MG/1; MG/1; MG/1; MG/1; MG/1; [IU]/1; MG/1; MG/1; MG/1; MG/1; MG/1
1 TABLET, FILM COATED ORAL EVERY MORNING
Status: DISCONTINUED | OUTPATIENT
Start: 2023-12-03 | End: 2023-12-04 | Stop reason: HOSPADM

## 2023-12-03 RX ORDER — SODIUM CHLORIDE, SODIUM LACTATE, POTASSIUM CHLORIDE, CALCIUM CHLORIDE 600; 310; 30; 20 MG/100ML; MG/100ML; MG/100ML; MG/100ML
INJECTION, SOLUTION INTRAVENOUS PRN
Status: DISCONTINUED | OUTPATIENT
Start: 2023-12-03 | End: 2023-12-04 | Stop reason: HOSPADM

## 2023-12-03 RX ORDER — IBUPROFEN 800 MG/1
800 TABLET ORAL EVERY 8 HOURS PRN
Status: DISCONTINUED | OUTPATIENT
Start: 2023-12-03 | End: 2023-12-04 | Stop reason: HOSPADM

## 2023-12-03 RX ORDER — TERBUTALINE SULFATE 1 MG/ML
0.25 INJECTION, SOLUTION SUBCUTANEOUS
Status: DISCONTINUED | OUTPATIENT
Start: 2023-12-03 | End: 2023-12-03 | Stop reason: HOSPADM

## 2023-12-03 RX ORDER — ONDANSETRON 4 MG/1
4 TABLET, ORALLY DISINTEGRATING ORAL EVERY 6 HOURS PRN
Status: DISCONTINUED | OUTPATIENT
Start: 2023-12-03 | End: 2023-12-03 | Stop reason: HOSPADM

## 2023-12-03 RX ORDER — DOCUSATE SODIUM 100 MG/1
100 CAPSULE, LIQUID FILLED ORAL 2 TIMES DAILY PRN
Status: DISCONTINUED | OUTPATIENT
Start: 2023-12-03 | End: 2023-12-04 | Stop reason: HOSPADM

## 2023-12-03 RX ORDER — LIDOCAINE HYDROCHLORIDE 10 MG/ML
20 INJECTION, SOLUTION INFILTRATION; PERINEURAL
Status: DISCONTINUED | OUTPATIENT
Start: 2023-12-03 | End: 2023-12-03 | Stop reason: HOSPADM

## 2023-12-03 RX ADMIN — FENTANYL CITRATE 100 MCG: 50 INJECTION, SOLUTION INTRAMUSCULAR; INTRAVENOUS at 03:36

## 2023-12-03 RX ADMIN — IBUPROFEN 800 MG: 800 TABLET, FILM COATED ORAL at 20:46

## 2023-12-03 RX ADMIN — OXYTOCIN 20 UNITS: 10 INJECTION, SOLUTION INTRAMUSCULAR; INTRAVENOUS at 03:00

## 2023-12-03 RX ADMIN — OXYTOCIN 125 ML/HR: 10 INJECTION, SOLUTION INTRAMUSCULAR; INTRAVENOUS at 03:45

## 2023-12-03 RX ADMIN — FENTANYL CITRATE 100 MCG: 50 INJECTION, SOLUTION INTRAMUSCULAR; INTRAVENOUS at 04:10

## 2023-12-03 RX ADMIN — OXYTOCIN 125 ML/HR: 10 INJECTION, SOLUTION INTRAMUSCULAR; INTRAVENOUS at 07:52

## 2023-12-03 RX ADMIN — IBUPROFEN 800 MG: 800 TABLET, FILM COATED ORAL at 07:12

## 2023-12-03 ASSESSMENT — LIFESTYLE VARIABLES
CONSUMPTION TOTAL: INCOMPLETE
HAVE PEOPLE ANNOYED YOU BY CRITICIZING YOUR DRINKING: NO
ALCOHOL_USE: NO
TOTAL SCORE: 0
EVER FELT BAD OR GUILTY ABOUT YOUR DRINKING: NO
EVER_SMOKED: NEVER
TOTAL SCORE: 0
TOTAL SCORE: 0
HAVE YOU EVER FELT YOU SHOULD CUT DOWN ON YOUR DRINKING: NO
EVER HAD A DRINK FIRST THING IN THE MORNING TO STEADY YOUR NERVES TO GET RID OF A HANGOVER: NO

## 2023-12-03 ASSESSMENT — PAIN DESCRIPTION - PAIN TYPE
TYPE: ACUTE PAIN

## 2023-12-03 NOTE — H&P
Admission History and Physical      Rosana Rick is a 27 y.o. female  at 39w2d who presents for abdominal pain     Subjective:   positive -  For CTXS.   positive Feels pain   negative for LOF  negative for vaginal bleeding.   positive for fetal movement    She reports that 1-2 hours prior to presentation, she noticed increasing abdominal pain.     ROS: Pertinent items are noted in HPI.    No past medical history on file.  Past Surgical History:   Procedure Laterality Date    TONSILLECTOMY       OB History    Para Term  AB Living   5 4 4     4   SAB IAB Ectopic Molar Multiple Live Births           0 4      # Outcome Date GA Lbr Tam/2nd Weight Sex Delivery Anes PTL Lv   5 Current            4 Term 21 38w2d / 00:10 3.14 kg (6 lb 14.8 oz) M Vag-Spont None N JEEVAN   3 Term 18 40w0d  3.629 kg (8 lb) F Vag-Spont EPI N JEEVAN      Birth Comments: Pt states no complications   2 Term 12 40w0d  2.722 kg (6 lb) F Vag-Spont EPI N JEEVAN      Birth Comments: Pt states no complications   1 Term 09 40w0d  2.722 kg (6 lb) M Vag-Spont EPI N JEEVAN      Birth Comments: Pt states no complications     Social History     Socioeconomic History    Marital status:      Spouse name: Not on file    Number of children: Not on file    Years of education: Not on file    Highest education level: Not on file   Occupational History    Not on file   Tobacco Use    Smoking status: Never    Smokeless tobacco: Never   Vaping Use    Vaping Use: Former   Substance and Sexual Activity    Alcohol use: Not Currently    Drug use: Not Currently     Types: Marijuana     Comment: last used in     Sexual activity: Yes     Partners: Male     Birth control/protection: None     Comment: not . Unplanned pregnancy   Other Topics Concern    Not on file   Social History Narrative    Not on file     Social Determinants of Health     Financial Resource Strain: Not on file   Food Insecurity: Not on file    Transportation Needs: Not on file   Physical Activity: Not on file   Stress: Not on file   Social Connections: Not on file   Intimate Partner Violence: Not on file   Housing Stability: Not on file     Allergies: Nkda [no known drug allergy]    Current Facility-Administered Medications:     LR infusion, , Intravenous, Continuous, Carrissia Zuhair, OGNP    lidocaine (XYLOCAINE) 1%  injection, 20 mL, Subcutaneous, Once PRN, Carrissia Zuhair, OGNP    terbutaline (Brethine) injection 0.25 mg, 0.25 mg, Subcutaneous, Once PRN, Carrissia Zuhair, OGNP    oxytocin (Pitocin) infusion bolus (for post delivery), 20 Units, Intravenous, Once **FOLLOWED BY** oxytocin (Pitocin) infusion (for post delivery), 125 mL/hr, Intravenous, Continuous, Carrissia Zuhair, OGNP    oxytocin (Pitocin) injection 10 Units, 10 Units, Intramuscular, Once PRN, Carrissia Zuhair, OGNP    ibuprofen (Motrin) tablet 800 mg, 800 mg, Oral, Once PRN, Carrissia Zuhair, OGNP    acetaminophen (Tylenol) tablet 1,000 mg, 1,000 mg, Oral, Once PRN, Carrissia Zuhair, OGNP    fentaNYL (Sublimaze) injection 50 mcg, 50 mcg, Intravenous, Q HOUR PRN **OR** fentaNYL (Sublimaze) injection 100 mcg, 100 mcg, Intravenous, Q HOUR PRN, Carrissia Zuhair, OGNP    ondansetron (Zofran ODT) dispertab 4 mg, 4 mg, Oral, Q6HRS PRN **OR** ondansetron (Zofran) syringe/vial injection 4 mg, 4 mg, Intravenous, Q6HRS PRN, Carrissia Zuhair, OGNP    Prenatal care with [facility] starting at 24 with following problems:  Patient Active Problem List    Diagnosis Date Noted    Labor and delivery indication for care or intervention 12/03/2023    Pregnancy with 33 completed weeks gestation 10/20/2023    Supervision of other normal pregnancy, antepartum 08/22/2023    History of abnormal cervical Pap smear 08/22/2023    ASB (asymptomatic bacteriuria) 08/02/2023    History of methamphetamine use 07/25/2023       Last US at 20 weeks  /18/2023 2:54 PM     HISTORY/REASON FOR EXAM:  Evaluate fetal  anatomy, late care     TECHNIQUE/EXAM DESCRIPTION: OB complete ultrasound.     COMPARISON:  None     FINDINGS:  Fetal Lie:  Vertex  LMP:  3/13/2023  Clinical ARIES by LMP:  12/5/2023     Placenta (Location):  Anterior  Placenta Previa: No     Amniotic Fluid Volume:  KAY = 9.67 cm     Fetal Heart Rate:  155 bpm     Cervical Length:  3.74 cm transabdominal     No maternal adnexal mass is identified.     Umbilical Artery S/D Ratio(s):  Not applicable     Fetal Anatomy  (Seen or Not Seen)  Lateral Ventricles     Seen  Cisterna Magna        Seen  Cerebellum              Seen  CSP             Seen  Orbits             Seen  Face/Lips                Seen  Cord Insertion         Seen  Placental CI         Seen  4 Chamber Heart     Seen  LVOT               Seen  RVOT              Seen  3 Vessel View     Seen  Stomach       Seen  Kidneys                   Seen  Urinary Bladder      Seen  Spine                       Seen  3 Vessel Cord          Seen  Both Upper Extremities    Seen  Both Lower Extremities    Seen  Diaphragm             Seen  Movement       Seen  Gender:  Likely male     Fetal Biometry  BPD    4.60 cm, 19 weeks, 6 days, (45.8%)  HC    17.24 cm, 19 weeks, 6 days, (32.9%)  AC    14.25 cm, 19 weeks, 4 days, (31.2%)  Femur Length    3.02 cm, 19 weeks, 2 days, (20.2%)  Humerus Length    2.93 cm, 19 weeks, 4 days, (38.7%)  Cerebellum Diameter   1.99 cm     EGA by this US:  19 weeks, 4 days  ARIES by this US: 12/8/2023  ARIES by 1st US:  Not applicable     Estimated Fetal Weight:  297 grams  EFW Percentile: 21.8%     IMPRESSION:     Single intrauterine pregnancy of an estimated gestational age of 19 weeks, 4 days with an estimated date of delivery of 12/8/2023.     Fetal survey within normal limits.        Objective:      /73   Pulse 64   Temp 36.6 °C (97.8 °F) (Temporal)   Ht 1.524 m (5')   Wt 67.1 kg (148 lb)     General:   no acute distress, alert, cooperative   Skin:   Normal, multiple tattoos   HEENT:  JULIA    Lungs:   CTA bilateral   Heart:   S1, S2 normal, no murmur, click, rub or gallop, regular rate and rhythm, peripheral pulses very brisk, chest is clear without rales or wheezing, no pedal edema, no JVD, no hepatosplenomegaly   Abdomen:   gravid, NT   EFW:  3300   Pelvis:  adequate, diagnonal conjugate not met   FHT:  140 BPM   Uterine Size: S=D   Presentations: Cephalic   Cervix:  Per RN    Dilation: 3-4    Effacement: 60    Station:  -2    Consistency: Soft    Position: Anterior     Lab Review  Lab:   Blood type: A     Recent Results (from the past 5880 hour(s))   THINPREP PAP W/HPV AND CTNG    Collection Time: 07/25/23  4:06 PM   Result Value Ref Range    HPV Aptima Negative Negative    DIAGNOSIS: SEE BELOW     Specimen adequacy: SEE BELOW     Performed by: SEE BELOW     QC reviewed by: SEE BELOW     Comment Comment     Note: SEE BELOW     Test Methodology: SEE BELOW     Chlamydia, Nuc. Acid Amp Negative Negative    Gonococcus, Nuc. Acid Amp Negative Negative   URINE DRUG SCREEN W/CONF (AR)    Collection Time: 08/01/23  2:10 PM   Result Value Ref Range    Urine Amphetamine-Methamphetam Negative Cutoff 300 ng/mL    Barbiturates Negative Cutoff 200 ng/mL    Benzodiazepines Negative Cutoff 200 ng/mL    Propoxyphene Negative Cutoff 300 ng/mL    Cocaine Metabolite Negative Cutoff 150 ng/mL    Methadone Negative Cutoff 150 ng/mL    Codeine-Morphine Negative Cutoff 300 ng/mL    Phencyclidine -Pcp Negative Cutoff 25 ng/mL    Cannabinoid Metab Negative Cutoff 50 ng/mL    Drug Comment Urine Drugs See Note    AFP TETRA    Collection Time: 08/01/23  2:11 PM   Result Value Ref Range    AFP Value -Eia 106 ng/mL    AFP MOM Value 1.28     Ue3 Value 5.58 ng/mL    Ue3 Mom 1.84     Patient's hCG, 2nd Trimester 59981 IU/L    hCG MoM, 2nd Trimester 1.34     Olive Value -Eia 164 pg/mL    Olive Mom Value 0.83     Interpretation Screen Neg     M[age] at ARIES 30[age]0 yr    Maternal Weight 134.0 lbs.     Gest. Age on Collection Date 21  wks, 4 days     Gestational Age Based On Ultrasound     Multiple Pregnancy Ferrari     Race Unknown     Insulin Dependent Diabetes No     Smoking No     Family Hx NTD No     Family Hx of Aneuploidy No     Specimen See Note     EER Quad, Maternal Serum See Note    PREG CNTR PRENATAL PN    Collection Time: 08/01/23  2:11 PM   Result Value Ref Range    WBC 8.1 4.8 - 10.8 K/uL    RBC 4.24 4.20 - 5.40 M/uL    Hemoglobin 11.5 (L) 12.0 - 16.0 g/dL    Hematocrit 35.8 (L) 37.0 - 47.0 %    MCV 84.4 81.4 - 97.8 fL    MCH 27.1 27.0 - 33.0 pg    MCHC 32.1 (L) 32.2 - 35.5 g/dL    RDW 40.3 35.9 - 50.0 fL    Platelet Count 341 164 - 446 K/uL    MPV 9.3 9.0 - 12.9 fL    Hepatitis C Antibody Non-Reactive Non-Reactive    Hepatitis B Surface Antigen Non-Reactive Non-Reactive    Rubella IgG Antibody 59.10 IU/mL    Syphilis, Treponemal Qual Non-Reactive Non-Reactive   URINE CULTURE(NEW)    Collection Time: 08/01/23  2:11 PM    Specimen: Urine   Result Value Ref Range    Significant Indicator POS (POS)     Source UR     Site Clean Catch     Culture Result - (A)     Culture Result Escherichia coli  >100,000 cfu/mL   (A)     Culture Result Enterococcus faecalis  >100,000 cfu/mL   (A)        Susceptibility    Enterococcus faecalis - PERLA     Ampicillin <=2 Sensitive mcg/mL     Nitrofurantoin <=32 Sensitive mcg/mL     Vancomycin 1 Sensitive mcg/mL     Tetracycline <=4 Sensitive mcg/mL     Daptomycin 2 Sensitive mcg/mL     Penicillin 2 Sensitive mcg/mL    Escherichia coli - PERLA     Ampicillin <=8 Sensitive mcg/mL     Ceftriaxone <=1 Sensitive mcg/mL     Cefazolin* <=2 Sensitive mcg/mL      * Breakpoints when Cefazolin is used for therapy of infections  other than uncomplicated UTIs due to Enterobacterales are as  follows:  PERLA and Interpretation:  <=2 S  4 I  >=8 R       Ciprofloxacin <=0.25 Sensitive mcg/mL     Cefepime <=2 Sensitive mcg/mL     Cefuroxime <=4 Sensitive mcg/mL     Nitrofurantoin <=32 Sensitive mcg/mL     Gentamicin <=2  Sensitive mcg/mL     Ampicillin/sulbactam <=4/2 Sensitive mcg/mL     Levofloxacin <=0.5 Sensitive mcg/mL     Minocycline <=4 Sensitive mcg/mL     Pip/Tazobactam <=8 Sensitive mcg/mL     Trimeth/Sulfa <=0.5/9.5 Sensitive mcg/mL     Tigecycline <=2 Sensitive mcg/mL     Tobramycin <=2 Sensitive mcg/mL   HIV AG/AB COMBO ASSAY SCREENING    Collection Time: 08/01/23  2:11 PM   Result Value Ref Range    HIV Ag/Ab Combo Assay Non-Reactive Non Reactive   OP Prenatal Panel-Blood Bank    Collection Time: 08/01/23  2:11 PM   Result Value Ref Range    ABO Grouping Only A     Rh Grouping Only POS     Antibody Screen Scrn NEG    URINE CULTURE(NEW)    Collection Time: 08/22/23  3:06 PM    Specimen: Urine   Result Value Ref Range    Significant Indicator POS (POS)     Source UR     Site -     Culture Result - (A)     Culture Result Enterococcus faecalis  >100,000 cfu/mL   (A)        Susceptibility    Enterococcus faecalis - PERLA     Daptomycin 1 Sensitive mcg/mL     Nitrofurantoin <=32 Sensitive mcg/mL     Ampicillin <=2 Sensitive mcg/mL     Vancomycin 1 Sensitive mcg/mL     Penicillin 2 Sensitive mcg/mL     Tetracycline <=4 Sensitive mcg/mL   CBC WITHOUT DIFFERENTIAL    Collection Time: 09/22/23  8:53 AM   Result Value Ref Range    WBC 8.1 4.8 - 10.8 K/uL    RBC 4.28 4.20 - 5.40 M/uL    Hemoglobin 10.6 (L) 12.0 - 16.0 g/dL    Hematocrit 35.1 (L) 37.0 - 47.0 %    MCV 82.0 81.4 - 97.8 fL    MCH 24.8 (L) 27.0 - 33.0 pg    MCHC 30.2 (L) 32.2 - 35.5 g/dL    RDW 40.1 35.9 - 50.0 fL    Platelet Count 323 164 - 446 K/uL    MPV 9.4 9.0 - 12.9 fL   T.PALLIDUM AB JUNIOR (SCREENING)    Collection Time: 09/22/23  8:53 AM   Result Value Ref Range    Syphilis, Treponemal Qual Non-Reactive Non-Reactive   GLUCOSE 1HR GESTATIONAL    Collection Time: 09/22/23  8:53 AM   Result Value Ref Range    Glucose, Post Dose 132 70 - 139 mg/dL   URINE CULTURE(NEW)    Collection Time: 10/13/23  3:35 PM    Specimen: Urine   Result Value Ref Range    Significant  Indicator NEG     Source UR     Site -     Culture Result Usual urogenital kerry 10-50,000 cfu/mL    GRP B STREP, BY PCR (LACEY BROTH)    Collection Time: 11/21/23  1:13 PM    Specimen: Genital   Result Value Ref Range    Strep Gp B DNA PCR Negative Negative          Assessment:   Rosana Rick at 39w2d  Labor status: Early latent labor.  Obstetrical history significant for   Patient Active Problem List    Diagnosis Date Noted    Labor and delivery indication for care or intervention 12/03/2023    Pregnancy with 33 completed weeks gestation 10/20/2023    Supervision of other normal pregnancy, antepartum 08/22/2023    History of abnormal cervical Pap smear 08/22/2023    ASB (asymptomatic bacteriuria) 08/02/2023    History of methamphetamine use 07/25/2023   .      Plan:     1. Admit to L&D  2. GBS negative   3. Desires nothing for pain relief. Discussed position changes   4. Plan at this time is expectant management . Consider AROM for augmentation if appropriate.       CFeaster ANNE MARIE/ Dr. Cullen Attending\

## 2023-12-03 NOTE — DISCHARGE PLANNING
Discharge Planning Assessment Post Partum    Completed chart review and discussed with RN    Reason for Referral: history of drug use. Patient on high risk list - notified by [facility] of open case due to neglect and history of methamphetamine use.   Address: [address] [place] [address] in [place]  Type of Living Situation:stable  Mom Diagnosis: post partum  Baby Diagnosis:   Primary Language: english    Father of the Baby: Jaspreet Guerrier  Involved in baby’s care? yes  Contact Information: [phone]    [facility]  Mom's PCP: none  PCP for new baby:[facility]    Support System: family - 3 other children in care of grandparents  Coping/Bonding between mother & baby: appropriate  Supplies for Infant: prepared    Mom's Insurance: Medicaid FFS  Baby Covered on Insurance:yes  Mother Employed/School: not employed  Father works in a [employer]  Other children in the home/names & ages: 2 year old Tammyon Aidenjessica    Financial Hardship/Income: denies   Mom's Mental status: alert and oriented  [facility], WIC    CPS History: yes - other child in foster care in July, recently returned to mother's care. Still open case with Poppy at [facility]  Psychiatric History: denies  Domestic Violence History: denies  Drug/ETOH History: mother has history of drug use. Infant UDS pending    Resources Provided: community resources, PP support, [facility]  Referrals Made: [employer] [facility]     Clearance for Discharge: Pending UDS result. SW will discuss with [facility] worker tomorrow and clarify plan

## 2023-12-03 NOTE — PROGRESS NOTES
Patient's SO (Jaspreet Guerrier) is requesting information on paternity testing (is requesting that patient not be informed of this). See facesheet for his mobile number. Social consult placed.

## 2023-12-03 NOTE — PROGRESS NOTES
0700: Report received from Brandi HAN. Care assumed.     0730: Pt ambulated to restroom with RN assist. +void. Pt transferred to postpartum unit. Report given to Lydia HAN at bedside. Care relinquished.

## 2023-12-03 NOTE — PROGRESS NOTES
0140: Pt is a  with an EDC of 23 making her 39.2. Pt arrives to L&D reporting regular contractions for the past couple of hours every 5 mins. Denies VB, denies LOF.  EFM and TOCO applied. SVE 3-/-2. VSS.

## 2023-12-03 NOTE — L&D DELIVERY NOTE
Admit Date:   12/3/2023       Pregnancy Complications: none  Medical Induction of Labor: no  GBS: negative  Anesthesia: none  Gestational Age at delivery: 39.2 weeks    Patient  progressed well spontaneously and was noted to be 9 cm with BBOW. AROM was completed with small amount of blood tinged fluid returned. Patient noted to have edematous approximately 2 cm lip of anterior cervix that was thick and not consistent with remaining cervical exam. She had spontenaous pushing efforts and pushed well to deliver viable male infant in JULIO CÉSAR position at 0438 with coached pushing efforts. No nuchal was noted but multiple loops of cord noted wrapped around feet. Unraveled this and placed maternal abdomen where he was dried and stimulated. A spontaneous cry was noted. Apgar 8, 9      Pitocin infused via IV bolus. After delayed cord clamping, cord was doubly clamped and cut by maternal sister. Signs of placenta separation noted and gentle cord traction was completed. Intact placenta was delivered spontaneously at 0445 where 3VC was noted. EBL 150ml. Fundus firm with minimal massage. Inspection of vulva and vagina was completed and left labial abrasion noted. Routine postpartum care was initiated as mother and infant stable. Infant weight is pending        Roberto GUERRA CNM/ Dr. Cullen , Attending

## 2023-12-03 NOTE — PROGRESS NOTES
Pt arrived via wheelchair with belongings to room S313. Report received from ABBEY Chand&QUIRINO RN. Patient assessment complete and WDL. Fundus firm and lochia light, VSS. IV infusing 125mL of pitocin in L FA. Patient denies pain at this time,  see flowsheet. Patient ambulating to bathroom and voiding without difficulty. Patient denies any dizziness/lightheadedness or calf pain/tenderness. Patient also denies any chest pain, difficulty breathing or SOB, respiratory symptoms, or any recent ill contacts. Patient oriented to unit, routine postpartum cares, room, call light, emergency light, infant safety and security. Plan of care discussed with patient for the day including infant feeding every 2-3 hours or on demand, pain management, and ambulation in halls. Pain medication plan discussed with patient; patient states she will call if PRN pain medication is wanted. All questions/concerns addressed at this time. Call light within reach and patient encouraged to call with needs. Will continue with routine postpartum cares.

## 2023-12-03 NOTE — PROGRESS NOTES
0228-Rcvd report from triage RN and assumed care of pt.  0300-Pt feeling pressure. SVE=7/80/-2  0436-Shivani Moffett CNM at bedside. SVE=complete. AROM at 043  0438- of viable baby boy with 8/9 apgars  0700-Report given to dayshift RN

## 2023-12-04 ENCOUNTER — PHARMACY VISIT (OUTPATIENT)
Dept: PHARMACY | Facility: MEDICAL CENTER | Age: 28
End: 2023-12-04
Payer: COMMERCIAL

## 2023-12-04 VITALS
HEART RATE: 72 BPM | RESPIRATION RATE: 16 BRPM | TEMPERATURE: 99 F | DIASTOLIC BLOOD PRESSURE: 64 MMHG | WEIGHT: 148 LBS | BODY MASS INDEX: 29.06 KG/M2 | OXYGEN SATURATION: 96 % | HEIGHT: 60 IN | SYSTOLIC BLOOD PRESSURE: 102 MMHG

## 2023-12-04 LAB
ERYTHROCYTE [DISTWIDTH] IN BLOOD BY AUTOMATED COUNT: 40.1 FL (ref 35.9–50)
HCT VFR BLD AUTO: 27.7 % (ref 37–47)
HGB BLD-MCNC: 8.6 G/DL (ref 12–16)
MCH RBC QN AUTO: 22.6 PG (ref 27–33)
MCHC RBC AUTO-ENTMCNC: 31 G/DL (ref 32.2–35.5)
MCV RBC AUTO: 72.7 FL (ref 81.4–97.8)
PLATELET # BLD AUTO: 220 K/UL (ref 164–446)
PMV BLD AUTO: 10.6 FL (ref 9–12.9)
RBC # BLD AUTO: 3.81 M/UL (ref 4.2–5.4)
WBC # BLD AUTO: 8.7 K/UL (ref 4.8–10.8)

## 2023-12-04 PROCEDURE — 700102 HCHG RX REV CODE 250 W/ 637 OVERRIDE(OP): Performed by: ADVANCED PRACTICE MIDWIFE

## 2023-12-04 PROCEDURE — A9270 NON-COVERED ITEM OR SERVICE: HCPCS | Performed by: ADVANCED PRACTICE MIDWIFE

## 2023-12-04 PROCEDURE — 36415 COLL VENOUS BLD VENIPUNCTURE: CPT

## 2023-12-04 PROCEDURE — RXMED WILLOW AMBULATORY MEDICATION CHARGE: Performed by: STUDENT IN AN ORGANIZED HEALTH CARE EDUCATION/TRAINING PROGRAM

## 2023-12-04 PROCEDURE — 85027 COMPLETE CBC AUTOMATED: CPT

## 2023-12-04 RX ORDER — FERROUS SULFATE 325(65) MG
325 TABLET ORAL DAILY
Qty: 60 TABLET | Refills: 0 | Status: SHIPPED | OUTPATIENT
Start: 2023-12-04

## 2023-12-04 RX ORDER — PSEUDOEPHEDRINE HCL 30 MG
100 TABLET ORAL 2 TIMES DAILY PRN
Qty: 60 CAPSULE | Refills: 0 | Status: SHIPPED | OUTPATIENT
Start: 2023-12-04

## 2023-12-04 RX ORDER — IBUPROFEN 800 MG/1
800 TABLET ORAL EVERY 8 HOURS PRN
Qty: 30 TABLET | Refills: 0 | Status: SHIPPED | OUTPATIENT
Start: 2023-12-04

## 2023-12-04 RX ORDER — ACETAMINOPHEN 500 MG
1000 TABLET ORAL EVERY 6 HOURS PRN
Qty: 30 TABLET | Refills: 0 | Status: SHIPPED | OUTPATIENT
Start: 2023-12-04

## 2023-12-04 RX ORDER — ASCORBIC ACID 500 MG
500 TABLET ORAL DAILY
Qty: 60 TABLET | Refills: 0 | Status: SHIPPED | OUTPATIENT
Start: 2023-12-04

## 2023-12-04 RX ORDER — MEDROXYPROGESTERONE ACETATE 150 MG/ML
150 INJECTION, SUSPENSION INTRAMUSCULAR ONCE
Status: DISCONTINUED | OUTPATIENT
Start: 2023-12-04 | End: 2023-12-04 | Stop reason: HOSPADM

## 2023-12-04 RX ADMIN — PRENATAL WITH FERROUS FUM AND FOLIC ACID 1 TABLET: 3080; 920; 120; 400; 22; 1.84; 3; 20; 10; 1; 12; 200; 27; 25; 2 TABLET ORAL at 09:54

## 2023-12-04 RX ADMIN — IBUPROFEN 800 MG: 800 TABLET, FILM COATED ORAL at 10:17

## 2023-12-04 ASSESSMENT — EDINBURGH POSTNATAL DEPRESSION SCALE (EPDS)
I HAVE LOOKED FORWARD WITH ENJOYMENT TO THINGS: AS MUCH AS I EVER DID
I HAVE BEEN SO UNHAPPY THAT I HAVE BEEN CRYING: ONLY OCCASIONALLY
I HAVE BEEN ABLE TO LAUGH AND SEE THE FUNNY SIDE OF THINGS: AS MUCH AS I ALWAYS COULD
I HAVE BEEN SO UNHAPPY THAT I HAVE HAD DIFFICULTY SLEEPING: NOT AT ALL
I HAVE FELT SCARED OR PANICKY FOR NO GOOD REASON: NO, NOT MUCH
THE THOUGHT OF HARMING MYSELF HAS OCCURRED TO ME: NEVER
I HAVE FELT SAD OR MISERABLE: NOT VERY OFTEN
THINGS HAVE BEEN GETTING ON TOP OF ME: NO, MOST OF THE TIME I HAVE COPED QUITE WELL
I HAVE BLAMED MYSELF UNNECESSARILY WHEN THINGS WENT WRONG: NO, NEVER
I HAVE BEEN ANXIOUS OR WORRIED FOR NO GOOD REASON: YES, SOMETIMES

## 2023-12-04 ASSESSMENT — PAIN DESCRIPTION - PAIN TYPE
TYPE: ACUTE PAIN
TYPE: ACUTE PAIN

## 2023-12-04 NOTE — DISCHARGE INSTRUCTIONS
PATIENT DISCHARGE EDUCATION INSTRUCTION SHEET    REASONS TO CALL YOUR OBSTETRICIAN  Persistent fever, shaking, chills (Temperature higher than 100.4) may indicate you have an infection  Heavy bleeding: soaking more than 1 pad per hour; Passing clots an egg-sized clot or bigger may mean you have an postpartum hemorrhage  Foul odor from vagina or bad smelling or discolored discharge or blood  Breast infection (Mastitis symptoms); breast pain, chills, fever, redness or red streaks, may feel flu like symptoms  Urinary pain, burning or frequency  Incision that is not healing, increased redness, swelling, tenderness or pain, or any pus from episiotomy or  site may mean you have an infection  Redness, swelling, warmth, or painful to touch in the calf area of your leg may mean you have a blood clot  Severe or intensified depression, thoughts or feelings of wanting to hurt yourself or someone else   Pain in chest, obstructed breathing or shortness of breath (trouble catching your breath) may mean you are having a postpartum complication. Call your provider immediately   Headache that does not get better, even after taking medicine, a bad headache with vision changes or pain in the upper right area of your belly may mean you have high blood pressure or post birth preeclampsia. Call your provider immediately    HAND WASHING  All family and friends should wash their hands:  Before and after holding the baby  Before feeding the baby  After using the restroom or changing the baby's diaper    WOUND CARE  Ask your physician for additional care instructions. In general:  Episiotomy/Laceration  May use kelli-spray bottle, witch hazel pads and dermaplast spray for comfort  Use kelli-spray bottle after urinating to cleanse perineal area  To prevent burning during urination spray kelli-water bottle on labial area   Pat perineal area dry until episiotomy/laceration is healed  Continue to use kelli-bottle until bleeding stops as  needed  If have a 2nd degree laceration or greater, a Sitz bath can offer relief from soreness, burning, and inflammation   Sitz Bath   Sit in 6 inches of warm water and soak laceration as needed until the laceration heals    VAGINAL CARE AND BLEEDING  Nothing inside vagina for 6 weeks:   No sexual intercourse, tampons or douching  Bleeding may continue for 2-4 weeks. Amount and color may vary  Soaking 1 pad or more in an hour for several hours is considered heavy bleeding  Passing large egg sized blood clots can be concerning  If you feel like you have heavy bleeding or are having increasing amount of blood clots call your Obstetrician immediately  If you begin feeling faint upon standing, feeling sick to your stomach, have clammy skin, a really fast heartbeat, have chills, start feeling confused, dizzy, sleepy or weak, or feeling like you're going to faint call your Obstetrician immediately    HYPERTENSION   Preeclampsia or gestational hypertension are types of high blood pressure that only pregnant women can get. It is important for you to be aware of symptoms to seek early intervention and treatment. If you have any of these symptoms immediately call your Obstetrician    Vision changes or blurred vision   Severe headache or pain that is unrelieved with medication and will not go away  Persistent pain in upper abdomen or shoulder   Increased swelling of face, feet, or hands  Difficulty breathing or shortness of breath at rest  Urinating less than usual    URINATION AND BOWEL MOVEMENTS  Eating more fiber (bran cereal, fruits, and vegetables) and drinking plenty of fluids will help to avoid constipation  Urinary frequency and urgency after childbirth is normal  If you experience any urinary pain, burning or frequency call your provider    BIRTH CONTROL  It is possible to become pregnant at any time after delivery and while breastfeeding  Plan to discuss a method of birth control with your physician at your post  "delivery follow up visit    POSTPARTUM BLUES  During the first few days after birth, you may experience a sense of the \"blues\" which may include impatience, irritability or even crying. These feelings come and go quickly. However, as many as 1 in 10 women experience emotional symptoms known as postpartum depression.     POSTPARTUM DEPRESSION    May start as early as the second or third day after delivery or take several weeks or months to develop. Symptoms of \"blues\" are present, but are more intense: Crying spells; loss of appetite; feelings of hopelessness or loss of control; fear of touching the baby; over concern or no concern at all about the baby; little or no concern about your own appearance/caring for yourself; and/or inability to sleep or excessive sleeping. Contact your Obstetrician if you are experiencing any of these symptoms     PREVENTING SHAKEN BABY  If you are angry or stressed, PUT THE BABY IN THE CRIB, step away, take some deep breaths, and wait until you are calm to care for the baby. DO NOT SHAKE THE BABY. You are not alone, call a supporter for help.  [facility] 24/7 crisis call line (613-883-8920) or (1-997.850.7068)  You can also text them, text \"ANSWER\" (574437)    Pelvic rest x6 weeks  Return for follow up visit in 5-6 weeks.  Call or come to ED for: heavy vaginal bleeding, fever >100.4, severe abdominal pain, severe headache, chest pain, shortness of breath,  N/V, or other concerns.    "

## 2023-12-04 NOTE — DISCHARGE PLANNING
:    Infant's UDS is positive for fentanyl which MOB was given during labor.  Called in a new report to Dorothy Carreon with [facility].  Dorothy stated she would discuss with her Supervisor and call us back to let us know if they will be coming out to the hospital or following up with family once they are home.      Plan:  Waiting to hear back from [facility].    10:52 am-Received call back from Dorothy Carreno stating baby is cleared to discharge home with mother and they will continue to follow up with family once they are home.

## 2023-12-04 NOTE — LACTATION NOTE
This note was copied from a baby's chart.  Baby 39.2 weeks, , baby 32 hours old, MOB Hx denies risk factors. MOB Hx Meth & THC, open CPS case. Mother reports she breast fed previous babies x 2 weeks. Baby swaddled & asleep in mother's arms, mother declined latch assist at this time (latch not seen). Mother indifferent to lactation education.     Feed baby with feeding cues and at least a minimum of 8x/24 hours.  Expect cluster feeding as this is normal during early days of life and growth spurts.  It is not recommended to let baby sleep longer than 4 hours between feedings and if sleepy, place skin to skin to promote feeding interest and milk production.  Baby's usually feed more frequently and longer when skin to skin with mother.    MOB has [facility], has [facility].    Breastfeeding plan:  Breastfeed on cue a minimum 8 or more times in 24 hours no longer than 3 hours from last feed.

## 2023-12-04 NOTE — CARE PLAN
The patient is Stable - Low risk of patient condition declining or worsening    Shift Goals  Clinical Goals: pain management, fundus firm with light lochia and stable VS    Progress made toward(s) clinical / shift goals:  Patient has not needed any PRN pain medication this shift, fundus is firm with light lochia and stable VS    Patient is not progressing towards the following goals: N/A

## 2023-12-04 NOTE — PROGRESS NOTES
0830 Assessment completed, fundus firm, lochia light. Plan of care reviewed, verbalized understanding. Denies pain at this time, will call if pain med intervention needed.     1530 Discharge instructions reviewed, verbalized understanding, papers signed. Prescribed meds and information given, reviewed, verbalized understanding.     1740 Left facility on wheelchair, escorted by staff.

## 2023-12-04 NOTE — CARE PLAN
Problem: Knowledge Deficit - Postpartum  Goal: Patient will verbalize and demonstrate understanding of self and infant care  Outcome: Progressing     Problem: Psychosocial - Postpartum  Goal: Patient will verbalize and demonstrate effective bonding and parenting behavior  Outcome: Progressing     Problem: Altered Physiologic Condition  Goal: Patient physiologically stable as evidenced by normal lochia, palpable uterine involution and vitals within normal limits  Outcome: Progressing   The patient is Stable - Low risk of patient condition declining or worsening    Shift Goals  Clinical Goals: stable VS and lochia WNL  Patient Goals: rest and care or baby    Progress made toward(s) clinical / shift goals:    Pt reports comfort after pain interventions, Fundus firm and lochia light, VSS, educated on POC, needs met at this time. Questions answered. Will continue to educate.

## 2024-10-06 NOTE — PROGRESS NOTES
1900- Report from SHI Regalado RN, to bedside, pt sleeping    2130- Family to bedside, pt eating, up to shower, reports pain mild at this time. +FM, no c/o cramping/ctx/LOF/VB     0235- Temp 101.1, Tylenol per MAR     0452- Temp 98.5, pt sleeping throughout night     0700- Report to SHI Regalado RN     PAST MEDICAL HISTORY:  Anxiety     Bipolar 1 disorder     Diabetes mellitus, type 2     H/O insomnia     Hypercholesteremia     IBS (Irritable Bowel Syndrome)     Mild Depression     Mitral Valve Prolapse     Obesity, morbid, BMI 40.0-49.9     PRESTON (Obstructive Sleep Apnea) cpap at night    Sustained Supraventricular Tachycardia on meds

## 2025-02-18 ENCOUNTER — APPOINTMENT (OUTPATIENT)
Dept: URGENT CARE | Facility: CLINIC | Age: 30
End: 2025-02-18
Payer: MEDICAID

## 2025-02-18 ENCOUNTER — OFFICE VISIT (OUTPATIENT)
Dept: URGENT CARE | Facility: CLINIC | Age: 30
End: 2025-02-18
Payer: MEDICAID

## 2025-02-18 VITALS
OXYGEN SATURATION: 97 % | WEIGHT: 131.4 LBS | TEMPERATURE: 98 F | HEIGHT: 60 IN | BODY MASS INDEX: 25.8 KG/M2 | RESPIRATION RATE: 16 BRPM | HEART RATE: 90 BPM

## 2025-02-18 DIAGNOSIS — K04.7 DENTAL INFECTION: ICD-10-CM

## 2025-02-18 PROCEDURE — 99203 OFFICE O/P NEW LOW 30 MIN: CPT

## 2025-02-18 RX ORDER — CLINDAMYCIN HYDROCHLORIDE 300 MG/1
300 CAPSULE ORAL 3 TIMES DAILY
Qty: 21 CAPSULE | Refills: 0 | Status: SHIPPED | OUTPATIENT
Start: 2025-02-18 | End: 2025-02-25

## 2025-02-18 ASSESSMENT — ENCOUNTER SYMPTOMS: FEVER: 0

## 2025-02-19 NOTE — PROGRESS NOTES
Verbal consent was acquired by the patient to use [employer] ambient listening note generation during this visit   Subjective:   Rsoana Rick is a 29 y.o. female who presents for Other (Possible tooth abscess on left side under the tongue )      HPI:  History of Present Illness  The patient is a 29-year-old female who presents for evaluation of dental infection    She began experiencing discomfort associated with the abscess approximately 2 days ago, which was exacerbated yesterday. She reports no febrile episodes. She has not taken any medication today. She has scheduled an appointment with her dentist.    She experienced severe pain last night, which was intense enough to induce crying. Currently, she is concerned about the facial swelling.           Review of Systems   Constitutional:  Negative for fever.   HENT:          +dental pain       Medications:    Current Outpatient Medications on File Prior to Visit   Medication Sig Dispense Refill    acetaminophen (TYLENOL) 500 MG Tab Take 2 Tablets by mouth every 6 hours as needed for Moderate Pain or Mild Pain. Do not exceed 4,000 mg in 24 hours. 30 Tablet 0    ibuprofen (MOTRIN) 800 MG Tab Take 1 Tablet by mouth every 8 hours as needed for Moderate Pain or Mild Pain. 30 Tablet 0    docusate sodium 100 MG Cap Take 1 capsule by mouth 2 times a day as needed for Constipation. (Patient not taking: Reported on 2/18/2025) 60 Capsule 0    ferrous sulfate 325 (65 Fe) MG tablet Take 1 Tablet by mouth every day. (Patient not taking: Reported on 2/18/2025) 60 Tablet 0    ascorbic acid (VITAMIN C) 500 MG tablet Take 1 Tablet by mouth every day. Take with iron to help increase iron absorption. (Patient not taking: Reported on 2/18/2025) 60 Tablet 0    Prenatal Multivit-Min-Fe-FA (PRENATAL 1 + IRON PO) Take  by mouth.       No current facility-administered medications on file prior to visit.        Allergies:   Nkda [no known drug allergy]    Problem List:   Patient Active  Problem List   Diagnosis    History of methamphetamine use    ASB (asymptomatic bacteriuria)    Supervision of other normal pregnancy, antepartum    History of abnormal cervical Pap smear    Pregnancy with 33 completed weeks gestation    Labor and delivery indication for care or intervention        Surgical History:  Past Surgical History:   Procedure Laterality Date    TONSILLECTOMY         Past Social Hx:   Social History     Tobacco Use    Smoking status: Never    Smokeless tobacco: Never   Vaping Use    Vaping status: Former   Substance Use Topics    Alcohol use: Not Currently    Drug use: Not Currently     Types: Marijuana     Comment: last used in 2022          Problem list, medications, and allergies reviewed by myself today in Epic.     Objective:     Pulse 90   Temp 36.7 °C (98 °F) (Temporal)   Resp 16   Ht 1.524 m (5')   Wt 59.6 kg (131 lb 6.4 oz)   SpO2 97%   BMI 25.66 kg/m²     Physical Exam  Vitals and nursing note reviewed.   Constitutional:       General: She is not in acute distress.     Appearance: Normal appearance. She is normal weight. She is not ill-appearing, toxic-appearing or diaphoretic.   HENT:      Head: Normocephalic and atraumatic.        Comments: Moderate facial swelling     Mouth/Throat:      Dentition: Abnormal dentition. Dental tenderness, gingival swelling, dental caries and dental abscesses present.        Comments: +dental decay, +broken teeth  Cardiovascular:      Rate and Rhythm: Normal rate and regular rhythm.      Pulses: Normal pulses.      Heart sounds: Normal heart sounds. No murmur heard.     No friction rub. No gallop.   Pulmonary:      Effort: Pulmonary effort is normal. No respiratory distress.      Breath sounds: Normal breath sounds. No stridor. No wheezing, rhonchi or rales.   Chest:      Chest wall: No tenderness.   Musculoskeletal:      Cervical back: Neck supple. No tenderness.   Lymphadenopathy:      Cervical: No cervical adenopathy.   Skin:     General:  Skin is warm and dry.      Capillary Refill: Capillary refill takes less than 2 seconds.   Neurological:      General: No focal deficit present.      Mental Status: She is alert and oriented to person, place, and time. Mental status is at baseline.      Cranial Nerves: No cranial nerve deficit.      Motor: No weakness.      Gait: Gait normal.   Psychiatric:         Mood and Affect: Mood normal.         Behavior: Behavior normal.         Thought Content: Thought content normal.         Judgment: Judgment normal.         Assessment/Plan:     Diagnosis and associated orders:   1. Dental infection  - clindamycin (CLEOCIN) 300 MG Cap; Take 1 Capsule by mouth 3 times a day for 7 days.  Dispense: 21 Capsule; Refill: 0        Comments/MDM:   Pt is clinically stable at today's acute urgent care visit.  No acute distress noted. Appropriate for outpatient management at this time.     Assessment & Plan    She reports experiencing significant pain and swelling which started 2 days ago and worsened yesterday. No fevers were reported. A prescription for clindamycin will be prescribed. She is advised to follow up with her dentist and maintain routine oral hygiene. Tylenol and motrin for pain relief.                Discussed DDx, management options (risks,benefits, and alternatives to planned treatment), natural progression and supportive care.  Expressed understanding and the treatment plan was agreed upon. Questions were encouraged and answered   Return to urgent care prn if new or worsening sx or if there is no improvement in condition prn.    Educated in Red flags and indications to immediately call 911 or present to the Emergency Department.   Advised the patient to follow-up with the primary care physician for recheck, reevaluation, and consideration of further management.    I personally reviewed prior external notes and test results pertinent to today's visit.  I have independently reviewed and interpreted all diagnostics  ordered during this urgent care acute visit.         Please note that this dictation was created using voice recognition software. I have made a reasonable attempt to correct obvious errors, but I expect that there are errors of grammar and possibly content that I did not discover before finalizing the note.    This note was electronically signed by CHARLIE Mccarthy

## 2025-07-02 ENCOUNTER — OFFICE VISIT (OUTPATIENT)
Dept: URGENT CARE | Facility: CLINIC | Age: 30
End: 2025-07-02
Payer: MEDICAID

## 2025-07-02 VITALS
HEIGHT: 60 IN | TEMPERATURE: 97.4 F | SYSTOLIC BLOOD PRESSURE: 104 MMHG | DIASTOLIC BLOOD PRESSURE: 68 MMHG | HEART RATE: 90 BPM | RESPIRATION RATE: 17 BRPM | OXYGEN SATURATION: 94 % | BODY MASS INDEX: 26.5 KG/M2 | WEIGHT: 135 LBS

## 2025-07-02 DIAGNOSIS — N39.0 ACUTE URINARY TRACT INFECTION: ICD-10-CM

## 2025-07-02 LAB
APPEARANCE UR: NORMAL
BILIRUB UR STRIP-MCNC: NEGATIVE MG/DL
COLOR UR AUTO: YELLOW
GLUCOSE UR STRIP.AUTO-MCNC: NEGATIVE MG/DL
KETONES UR STRIP.AUTO-MCNC: NORMAL MG/DL
LEUKOCYTE ESTERASE UR QL STRIP.AUTO: NORMAL
NITRITE UR QL STRIP.AUTO: NEGATIVE
PH UR STRIP.AUTO: 6 [PH] (ref 5–8)
POCT INT CON NEG: NEGATIVE
POCT INT CON POS: POSITIVE
POCT URINE PREGNANCY TEST: NEGATIVE
PROT UR QL STRIP: NORMAL MG/DL
RBC UR QL AUTO: NORMAL
SP GR UR STRIP.AUTO: 1.02
UROBILINOGEN UR STRIP-MCNC: 0.2 MG/DL

## 2025-07-02 PROCEDURE — 3078F DIAST BP <80 MM HG: CPT | Performed by: NURSE PRACTITIONER

## 2025-07-02 PROCEDURE — 99213 OFFICE O/P EST LOW 20 MIN: CPT | Mod: 25 | Performed by: NURSE PRACTITIONER

## 2025-07-02 PROCEDURE — 81025 URINE PREGNANCY TEST: CPT | Performed by: NURSE PRACTITIONER

## 2025-07-02 PROCEDURE — 3074F SYST BP LT 130 MM HG: CPT | Performed by: NURSE PRACTITIONER

## 2025-07-02 PROCEDURE — 81002 URINALYSIS NONAUTO W/O SCOPE: CPT | Performed by: NURSE PRACTITIONER

## 2025-07-02 RX ORDER — NITROFURANTOIN 25; 75 MG/1; MG/1
100 CAPSULE ORAL 2 TIMES DAILY
Qty: 10 CAPSULE | Refills: 0 | Status: SHIPPED | OUTPATIENT
Start: 2025-07-02 | End: 2025-07-07

## 2025-07-02 NOTE — PROGRESS NOTES
Chief Complaint   Patient presents with    Other     Urination burning/discharge x3 days       HISTORY OF PRESENT ILLNESS: Patient is a pleasant 29 y.o. female who presents today due to three days of urinary burning, urgency, and frequency. Reports some associated pelvic pressure. Denies hematuria, fever, flank pain, N/V. Denies a history of pyelonephritis. Admits to a history of UTIs, denies any recent infections, symptoms feel similar.    Patient Active Problem List    Diagnosis Date Noted    Labor and delivery indication for care or intervention 12/03/2023    Pregnancy with 33 completed weeks gestation 10/20/2023    Supervision of other normal pregnancy, antepartum 08/22/2023    History of abnormal cervical Pap smear 08/22/2023    ASB (asymptomatic bacteriuria) 08/02/2023    History of methamphetamine use 07/25/2023       Allergies:Nkda [no known drug allergy]    Current Medications and Prescriptions Ordered in Epic[1]    Past Medical History[2]    Social History[3]    Family Status   Relation Name Status    Mo  Alive    Fa  Alive    Sis 3 Alive    Bro 6 Alive   No partnership data on file     Family History   Problem Relation Age of Onset    No Known Problems Mother     No Known Problems Father        ROS:  Review of Systems   Constitutional: Negative for fever, chills, weight loss and malaise/fatigue.   HENT: Negative for ear pain, nosebleeds, congestion, sore throat and neck pain.    Eyes: Negative for vision changes.   Neuro: Negative for headache, sensory changes, weakness, seizure, LOC.   Cardiovascular: Negative for chest pain, palpitations, orthopnea and leg swelling.   Respiratory: Negative for cough, sputum production, shortness of breath and wheezing.   Gastrointestinal: Positive for lower abdominal pressure. Negative for abdominal pain, nausea, vomiting or diarrhea.   Genitourinary: Positive for dysuria, urgency and frequency. Negative for hematuria or flank pain.   Skin: Negative for rash,  diaphoresis.     Exam:  /68   Pulse 90   Temp 36.3 °C (97.4 °F) (Temporal)   Resp 17   Ht 1.524 m (5')   Wt 61.2 kg (135 lb)   SpO2 94%   General: well-nourished, well-developed female in NAD  Head: normocephalic, atraumatic  Eyes: PERRLA, no conjunctival injection, acuity grossly intact, lids normal.  Lymph: no cervical adenopathy. No supraclavicular adenopathy.   Neuro: alert and oriented. Cranial nerves 1-12 grossly intact. No sensory deficit.   Cardiovascular: regular rate and rhythm. No edema.  Pulmonary: no distress. Chest is symmetrical with respiration, no wheezes, crackles, or rhonchi.   Abdomen: soft, non-tender, no guarding, no hepatosplenomegaly. No CVA tenderness.   Musculoskeletal: no clubbing, appropriate muscle tone, gait is stable.  Skin: warm, dry, intact, no clubbing, no cyanosis, no rashes.   Psych: appropriate mood, affect, judgement.         Assessment/Plan:  1. Acute urinary tract infection  POCT Urinalysis    POCT Pregnancy    nitrofurantoin (MACROBID) 100 MG Cap            Antibiotic as directed. Increase fluid intake.   Supportive care, differential diagnoses, and indications for immediate follow-up discussed with patient.   Pathogenesis of diagnosis discussed including typical length and natural progression.   Instructed to return to clinic or nearest emergency department for any change in condition, further concerns, or worsening of symptoms.  Patient states understanding of the plan of care and discharge instructions.  Instructed to make an appointment, for follow up, with their primary care provider.        Please note that this dictation was created using voice recognition software. I have made every reasonable attempt to correct obvious errors, but I expect that there are errors of grammar and possibly content that I did not discover before finalizing the note.      KEYONNA Chrisitan.          [1]   Current Outpatient Medications Ordered in Epic   Medication Sig Dispense  Refill    nitrofurantoin (MACROBID) 100 MG Cap Take 1 Capsule by mouth 2 times a day for 5 days. 10 Capsule 0     No current Epic-ordered facility-administered medications on file.   [2] No past medical history on file.  [3]   Social History  Tobacco Use    Smoking status: Never    Smokeless tobacco: Never   Vaping Use    Vaping status: Former   Substance Use Topics    Alcohol use: Not Currently    Drug use: Not Currently     Types: Marijuana     Comment: last used in 2022